# Patient Record
Sex: FEMALE | Race: WHITE | Employment: UNEMPLOYED | URBAN - METROPOLITAN AREA
[De-identification: names, ages, dates, MRNs, and addresses within clinical notes are randomized per-mention and may not be internally consistent; named-entity substitution may affect disease eponyms.]

---

## 2017-11-07 DIAGNOSIS — Z01.419 VISIT FOR GYNECOLOGIC EXAMINATION: ICD-10-CM

## 2017-11-07 DIAGNOSIS — Z30.41 ENCOUNTER FOR BIRTH CONTROL PILLS MAINTENANCE: ICD-10-CM

## 2017-11-07 RX ORDER — NORGESTIMATE AND ETHINYL ESTRADIOL 7DAYSX3 28
1 KIT ORAL DAILY
Qty: 30 TABLET | Refills: 0 | Status: SHIPPED | OUTPATIENT
Start: 2017-11-07 | End: 2017-12-11 | Stop reason: SDUPTHER

## 2017-12-11 DIAGNOSIS — Z01.419 VISIT FOR GYNECOLOGIC EXAMINATION: ICD-10-CM

## 2017-12-11 DIAGNOSIS — Z30.41 ENCOUNTER FOR BIRTH CONTROL PILLS MAINTENANCE: ICD-10-CM

## 2017-12-11 RX ORDER — NORGESTIMATE AND ETHINYL ESTRADIOL 7DAYSX3 28
1 KIT ORAL DAILY
Qty: 30 TABLET | Refills: 11 | Status: SHIPPED | OUTPATIENT
Start: 2017-12-11 | End: 2018-11-21 | Stop reason: SDUPTHER

## 2018-02-06 ENCOUNTER — TELEPHONE (OUTPATIENT)
Dept: FAMILY MEDICINE CLINIC | Age: 53
End: 2018-02-06

## 2018-02-06 DIAGNOSIS — R92.8 ABNORMAL MAMMOGRAM OF LEFT BREAST: Primary | ICD-10-CM

## 2018-02-06 NOTE — TELEPHONE ENCOUNTER
PT CALLING SHE RECEIVED A LETTER IN THE MAIL RE:  HER MAMMOGRAM ---  SHE NEEDS ADDITIONAL IMAGING STUDIES    SHE NEEDS A NEW ORDER - SENT TO  [facility]OM[facility] --  [address]    PHONE:   [phone]  Fax:  [phone]      PT @  [phone] - [address] [place] [address]

## 2018-03-05 ENCOUNTER — TELEPHONE (OUTPATIENT)
Dept: FAMILY MEDICINE CLINIC | Age: 53
End: 2018-03-05

## 2018-03-07 NOTE — TELEPHONE ENCOUNTER
[employer] MESSAGE SENT TO PT, PT DUE FOR DIAGNOSTIC MAMMOGRAM AND BREAST ULTRASOUND.   [facility]

## 2018-11-21 ENCOUNTER — OFFICE VISIT (OUTPATIENT)
Dept: FAMILY MEDICINE CLINIC | Age: 53
End: 2018-11-21

## 2018-11-21 VITALS
HEART RATE: 102 BPM | WEIGHT: 151 LBS | RESPIRATION RATE: 20 BRPM | HEIGHT: 63 IN | BODY MASS INDEX: 26.75 KG/M2 | DIASTOLIC BLOOD PRESSURE: 108 MMHG | SYSTOLIC BLOOD PRESSURE: 210 MMHG

## 2018-11-21 DIAGNOSIS — G89.29 CHRONIC NONINTRACTABLE HEADACHE, UNSPECIFIED HEADACHE TYPE: ICD-10-CM

## 2018-11-21 DIAGNOSIS — Z30.41 ENCOUNTER FOR BIRTH CONTROL PILLS MAINTENANCE: ICD-10-CM

## 2018-11-21 DIAGNOSIS — Z01.419 VISIT FOR GYNECOLOGIC EXAMINATION: ICD-10-CM

## 2018-11-21 DIAGNOSIS — N92.4 EXCESSIVE BLEEDING IN PREMENOPAUSAL PERIOD: ICD-10-CM

## 2018-11-21 DIAGNOSIS — I10 ESSENTIAL HYPERTENSION: Primary | ICD-10-CM

## 2018-11-21 DIAGNOSIS — R51.9 CHRONIC NONINTRACTABLE HEADACHE, UNSPECIFIED HEADACHE TYPE: ICD-10-CM

## 2018-11-21 LAB
A/G RATIO: 1.8 (ref 1.1–2.2)
ALBUMIN SERPL-MCNC: 4.6 G/DL (ref 3.4–5)
ALP BLD-CCNC: 103 U/L (ref 40–129)
ALT SERPL-CCNC: 17 U/L (ref 10–40)
ANION GAP SERPL CALCULATED.3IONS-SCNC: 14 MMOL/L (ref 3–16)
AST SERPL-CCNC: 17 U/L (ref 15–37)
BASOPHILS ABSOLUTE: 0.1 K/UL (ref 0–0.2)
BASOPHILS RELATIVE PERCENT: 0.7 %
BILIRUB SERPL-MCNC: <0.2 MG/DL (ref 0–1)
BUN BLDV-MCNC: 12 MG/DL (ref 7–20)
CALCIUM SERPL-MCNC: 9.7 MG/DL (ref 8.3–10.6)
CHLORIDE BLD-SCNC: 102 MMOL/L (ref 99–110)
CO2: 25 MMOL/L (ref 21–32)
CREAT SERPL-MCNC: 0.7 MG/DL (ref 0.6–1.1)
EOSINOPHILS ABSOLUTE: 0.1 K/UL (ref 0–0.6)
EOSINOPHILS RELATIVE PERCENT: 1.7 %
GFR AFRICAN AMERICAN: >60
GFR NON-AFRICAN AMERICAN: >60
GLOBULIN: 2.6 G/DL
GLUCOSE BLD-MCNC: 105 MG/DL (ref 70–99)
HCT VFR BLD CALC: 38.1 % (ref 36–48)
HEMOGLOBIN: 12.5 G/DL (ref 12–16)
LYMPHOCYTES ABSOLUTE: 2.4 K/UL (ref 1–5.1)
LYMPHOCYTES RELATIVE PERCENT: 28.6 %
MCH RBC QN AUTO: 26.6 PG (ref 26–34)
MCHC RBC AUTO-ENTMCNC: 32.8 G/DL (ref 31–36)
MCV RBC AUTO: 81 FL (ref 80–100)
MONOCYTES ABSOLUTE: 0.6 K/UL (ref 0–1.3)
MONOCYTES RELATIVE PERCENT: 7.4 %
NEUTROPHILS ABSOLUTE: 5.2 K/UL (ref 1.7–7.7)
NEUTROPHILS RELATIVE PERCENT: 61.6 %
PDW BLD-RTO: 14.7 % (ref 12.4–15.4)
PLATELET # BLD: 366 K/UL (ref 135–450)
PMV BLD AUTO: 8.4 FL (ref 5–10.5)
POTASSIUM SERPL-SCNC: 4.3 MMOL/L (ref 3.5–5.1)
RBC # BLD: 4.7 M/UL (ref 4–5.2)
SODIUM BLD-SCNC: 141 MMOL/L (ref 136–145)
TOTAL PROTEIN: 7.2 G/DL (ref 6.4–8.2)
WBC # BLD: 8.4 K/UL (ref 4–11)

## 2018-11-21 PROCEDURE — 36415 COLL VENOUS BLD VENIPUNCTURE: CPT | Performed by: FAMILY MEDICINE

## 2018-11-21 PROCEDURE — 99213 OFFICE O/P EST LOW 20 MIN: CPT | Performed by: FAMILY MEDICINE

## 2018-11-21 RX ORDER — BISOPROLOL FUMARATE AND HYDROCHLOROTHIAZIDE 5; 6.25 MG/1; MG/1
1-2 TABLET ORAL DAILY
Qty: 30 TABLET | Refills: 2 | Status: SHIPPED | OUTPATIENT
Start: 2018-11-21 | End: 2018-11-26 | Stop reason: SDUPTHER

## 2018-11-21 RX ORDER — AMLODIPINE BESYLATE 5 MG/1
5 TABLET ORAL DAILY
Qty: 30 TABLET | Refills: 2 | Status: SHIPPED | OUTPATIENT
Start: 2018-11-21 | End: 2018-12-04 | Stop reason: SDUPTHER

## 2018-11-21 RX ORDER — NORGESTIMATE AND ETHINYL ESTRADIOL 7DAYSX3 28
1 KIT ORAL DAILY
Qty: 30 TABLET | Refills: 5 | Status: SHIPPED | OUTPATIENT
Start: 2018-11-21 | End: 2019-03-12 | Stop reason: SDUPTHER

## 2018-11-21 ASSESSMENT — PATIENT HEALTH QUESTIONNAIRE - PHQ9
1. LITTLE INTEREST OR PLEASURE IN DOING THINGS: 0
SUM OF ALL RESPONSES TO PHQ QUESTIONS 1-9: 0
SUM OF ALL RESPONSES TO PHQ9 QUESTIONS 1 & 2: 0
SUM OF ALL RESPONSES TO PHQ QUESTIONS 1-9: 0
2. FEELING DOWN, DEPRESSED OR HOPELESS: 0

## 2018-11-21 NOTE — PROGRESS NOTES
occipital area   Eyes: Conjunctivae are normal. No scleral icterus. Cardiovascular: Normal rate, regular rhythm and normal heart sounds. No murmur heard. Pulmonary/Chest: Effort normal and breath sounds normal. No respiratory distress. She has no wheezes. She has no rales. Abdominal: Soft. Bowel sounds are normal. She exhibits no distension. There is no tenderness. Musculoskeletal: She exhibits no edema. Neurological: She is alert and oriented to person, place, and time. Skin: Skin is warm and dry. Psychiatric: She has a normal mood and affect. Assessment:       Diagnosis Orders   1. Essential hypertension     2. Visit for gynecologic examination  Norgestim-Eth Estrad Triphasic (TRINESSA, 28,) 0.18/0.215/0.25 MG-35 MCG TABS   3. Encounter for birth control pills maintenance  Norgestim-Eth Estrad Triphasic (TRINESSA, 28,) 0.18/0.215/0.25 MG-35 MCG TABS   4. Chronic nonintractable headache, unspecified headache type     5. Excessive bleeding in premenopausal period             Plan:      CONTINUE BCP RIGHT NOW BUT NEED TO GET BP DOWN  ZIAC 5/6.25 AND NORVASC 5MG DAILY W/ INCREASE TO DOUBLE DOSE OF BOTH AS NEEDED TO GET BP DOWN.   Monitor bp - precautions d/ w pt including low salt/ caffeine moderation/ diet/ exercise w/ low fat / increased fruit / veggies d/w pt  Check labs today  Suspect ha related to bp - cautious use of otc analgesics prn   avoid bladder irritants - push water for oab - if continued sx - consider med  kegel exercises dw/ pt   mmg reviewed  Jacquelyn Lowe MD

## 2018-11-23 ENCOUNTER — PATIENT MESSAGE (OUTPATIENT)
Dept: FAMILY MEDICINE CLINIC | Age: 53
End: 2018-11-23

## 2018-11-26 RX ORDER — BISOPROLOL FUMARATE AND HYDROCHLOROTHIAZIDE 5; 6.25 MG/1; MG/1
1 TABLET ORAL DAILY
Qty: 30 TABLET | Refills: 2 | Status: SHIPPED | OUTPATIENT
Start: 2018-11-26 | End: 2018-12-04 | Stop reason: SDUPTHER

## 2018-11-28 ENCOUNTER — PATIENT MESSAGE (OUTPATIENT)
Dept: FAMILY MEDICINE CLINIC | Age: 53
End: 2018-11-28

## 2018-11-28 NOTE — TELEPHONE ENCOUNTER
From: Wai Dewitt  To: KEYLA Sen CNP  Sent: 11/28/2018 12:58 PM EST  Subject: Prescription Question    SURYA Hubbard, I just went and got my blood pressure checked and it was 148/111. I hope this is better but if I should be doing anything else please let me know. I currently take one each of the medications the  prescribed. Thank you.   ----- Message -----  From: KEYLA Sen CNP  Sent: 11/26/2018 8:50 AM EST  To: Gaston West. Kandu  Subject: RE: Prescription Question  Dr Yarelis Dao is out today. I resent the [address] [place] [address] script with the directions to start with one daily. It looks like he had the directions of 1-2 tabs daily, so that was confusing. It looks like Dr. Yarelis Dao wanted to start with one daily of each of the meds and then increase as needed to get your blood pressures down. It is fine to be taking the amlodipine. That way you can see how you do with that before starting the other. You should be able to fill the other today. Let us know if you have any other troubles. Thanks,  Kelsie Hubbard        ----- Message -----   From: Gaston West. [facility]   Sent: 11/23/2018 11:40 AM EST   To: KEYLA Sen CNP  Subject: Prescription Question    Hello, I went to get these prescriptions but they are waiting on directions from you to fill one of them. I have the amlodipine and started taking it but I don't have the other one yet. Just wanted to know and if I shouldn't take one without the other please let me know.  Thank you

## 2018-12-04 ENCOUNTER — OFFICE VISIT (OUTPATIENT)
Dept: FAMILY MEDICINE CLINIC | Age: 53
End: 2018-12-04
Payer: COMMERCIAL

## 2018-12-04 VITALS
DIASTOLIC BLOOD PRESSURE: 88 MMHG | BODY MASS INDEX: 26.58 KG/M2 | SYSTOLIC BLOOD PRESSURE: 162 MMHG | HEIGHT: 63 IN | WEIGHT: 150 LBS

## 2018-12-04 DIAGNOSIS — I10 ESSENTIAL HYPERTENSION: Primary | ICD-10-CM

## 2018-12-04 DIAGNOSIS — Z12.4 ROUTINE PAPANICOLAOU SMEAR: ICD-10-CM

## 2018-12-04 DIAGNOSIS — D25.9 UTERINE LEIOMYOMA, UNSPECIFIED LOCATION: ICD-10-CM

## 2018-12-04 DIAGNOSIS — N92.0 MENORRHAGIA WITH REGULAR CYCLE: ICD-10-CM

## 2018-12-04 PROCEDURE — 99214 OFFICE O/P EST MOD 30 MIN: CPT | Performed by: FAMILY MEDICINE

## 2018-12-04 RX ORDER — AMLODIPINE BESYLATE 10 MG/1
10 TABLET ORAL DAILY
Qty: 30 TABLET | Refills: 2 | Status: SHIPPED | OUTPATIENT
Start: 2018-12-04 | End: 2019-11-06 | Stop reason: SDUPTHER

## 2018-12-04 RX ORDER — BISOPROLOL FUMARATE AND HYDROCHLOROTHIAZIDE 5; 6.25 MG/1; MG/1
2 TABLET ORAL DAILY
Qty: 60 TABLET | Refills: 2 | Status: SHIPPED | OUTPATIENT
Start: 2018-12-04 | End: 2019-11-06 | Stop reason: SDUPTHER

## 2018-12-04 NOTE — PROGRESS NOTES
Subjective:      Patient ID: Chito Stein is a 48 y.o. female. HPI  No chief complaint on file. BP Readings from Last 3 Encounters:   12/04/18 (!) 190/92   11/21/18 (!) 210/108   12/07/16 124/82     Pulse Readings from Last 3 Encounters:   11/21/18 102   12/07/16 69   01/05/16 70     Wt Readings from Last 3 Encounters:   12/04/18 150 lb (68 kg)   11/21/18 151 lb (68.5 kg)   12/07/16 143 lb 3.2 oz (65 kg)   148/111 on bp check   Today 162/88 today. Mom and mgm both w/ high bp  Doing kegel exercises   No more headaches since last visit  No hx of abnormal pap smear  Had hiv test in past  Same sex partner 32 years  Last pap 2-3 years ago - no abnormal pap in past - no pelvic pain/ dysmenorrhea  Review of Systems    Objective:   Physical Exam   Constitutional: She is oriented to person, place, and time. She appears well-developed and well-nourished. No distress. HENT:   Head: Normocephalic and atraumatic. Mouth/Throat: Oropharynx is clear and moist. No oropharyngeal exudate. Eyes: Conjunctivae are normal. No scleral icterus. Neck: No thyromegaly present. Cardiovascular: Normal rate, regular rhythm, normal heart sounds and intact distal pulses. No murmur heard. Pulmonary/Chest: Effort normal and breath sounds normal. No respiratory distress. She has no wheezes. She has no rales. Breasts symmetric w/o obvious palpable abnormality/ axillary lad   Abdominal: Soft. Bowel sounds are normal. She exhibits no distension. There is no tenderness. Genitourinary:   Genitourinary Comments: Normal genitalia - normal cervix  Large uterus suspected fibroid w/ some prolapse of uterus / cervix    Musculoskeletal: She exhibits no edema. Lymphadenopathy:     She has no cervical adenopathy. Neurological: She is alert and oriented to person, place, and time. Skin: Skin is warm and dry. Psychiatric: She has a normal mood and affect. Assessment:       Diagnosis Orders   1. Essential hypertension     2.

## 2019-11-07 RX ORDER — AMLODIPINE BESYLATE 10 MG/1
10 TABLET ORAL DAILY
Qty: 30 TABLET | Refills: 1 | Status: SHIPPED | OUTPATIENT
Start: 2019-11-07 | End: 2020-07-06

## 2019-11-07 RX ORDER — BISOPROLOL FUMARATE AND HYDROCHLOROTHIAZIDE 5; 6.25 MG/1; MG/1
2 TABLET ORAL DAILY
Qty: 60 TABLET | Refills: 1 | Status: SHIPPED | OUTPATIENT
Start: 2019-11-07 | End: 2020-07-06

## 2020-04-01 RX ORDER — NORGESTIMATE AND ETHINYL ESTRADIOL 7DAYSX3 28
1 KIT ORAL DAILY
Qty: 90 TABLET | Refills: 3 | Status: SHIPPED | OUTPATIENT
Start: 2020-04-01 | End: 2020-11-25 | Stop reason: SDUPTHER

## 2020-07-06 RX ORDER — BISOPROLOL FUMARATE AND HYDROCHLOROTHIAZIDE 5; 6.25 MG/1; MG/1
2 TABLET ORAL DAILY
Qty: 60 TABLET | Refills: 2 | Status: SHIPPED | OUTPATIENT
Start: 2020-07-06 | End: 2020-11-25 | Stop reason: SDUPTHER

## 2020-07-06 RX ORDER — AMLODIPINE BESYLATE 10 MG/1
10 TABLET ORAL DAILY
Qty: 30 TABLET | Refills: 2 | Status: SHIPPED | OUTPATIENT
Start: 2020-07-06 | End: 2020-11-25 | Stop reason: SDUPTHER

## 2020-11-25 ENCOUNTER — VIRTUAL VISIT (OUTPATIENT)
Dept: FAMILY MEDICINE CLINIC | Age: 55
End: 2020-11-25

## 2020-11-25 PROBLEM — I10 ESSENTIAL HYPERTENSION: Status: ACTIVE | Noted: 2020-11-25

## 2020-11-25 PROCEDURE — 99213 OFFICE O/P EST LOW 20 MIN: CPT | Performed by: FAMILY MEDICINE

## 2020-11-25 RX ORDER — AMLODIPINE BESYLATE 10 MG/1
10 TABLET ORAL DAILY
Qty: 30 TABLET | Refills: 5 | Status: SHIPPED | OUTPATIENT
Start: 2020-11-25 | End: 2021-09-23

## 2020-11-25 RX ORDER — BISOPROLOL FUMARATE AND HYDROCHLOROTHIAZIDE 5; 6.25 MG/1; MG/1
2 TABLET ORAL DAILY
Qty: 60 TABLET | Refills: 5 | Status: SHIPPED | OUTPATIENT
Start: 2020-11-25 | End: 2021-09-23

## 2020-11-25 RX ORDER — NORGESTIMATE AND ETHINYL ESTRADIOL 7DAYSX3 28
1 KIT ORAL DAILY
Qty: 90 TABLET | Refills: 1 | Status: SHIPPED | OUTPATIENT
Start: 2020-11-25 | End: 2021-05-20 | Stop reason: SDUPTHER

## 2020-11-25 ASSESSMENT — PATIENT HEALTH QUESTIONNAIRE - PHQ9
1. LITTLE INTEREST OR PLEASURE IN DOING THINGS: 0
SUM OF ALL RESPONSES TO PHQ QUESTIONS 1-9: 0
SUM OF ALL RESPONSES TO PHQ QUESTIONS 1-9: 0
2. FEELING DOWN, DEPRESSED OR HOPELESS: 0
SUM OF ALL RESPONSES TO PHQ9 QUESTIONS 1 & 2: 0
SUM OF ALL RESPONSES TO PHQ QUESTIONS 1-9: 0

## 2020-11-25 NOTE — PROGRESS NOTES
2020    TELEHEALTH EVALUATION -- Audio/Visual (During TELMW-60 public health emergency)    HPI:    Hayden Velázquez (:  1965) has requested an audio/video evaluation for the following concern(s):    Chief Complaint   Patient presents with    Hypertension     HTN ROUTINE FOLLOW UP    Contraception     BCP ROUTINE FOLLOW UP    Other     DEPRESSION SCREENING DUE     BP Readings from Last 3 Encounters:   18 (!) 162/88   18 (!) 210/108   16 124/82     Pulse Readings from Last 3 Encounters:   18 102   16 69   16 70     Wt Readings from Last 3 Encounters:   18 150 lb (68 kg)   18 151 lb (68.5 kg)   16 143 lb 3.2 oz (65 kg)     Staying isolated   Diet/ activity about the same - walks dogs regularly. Sleeping hard to stay asleep - falls asleep okay. Never took remeron before as antidepressant. Checking in - having menopausal symptoms - on bcp - still heavy flow q28 days -some clots - if misses pill, still spots  Heavy days for couple days then light days. Checks bp fairly regularly - taking 1 ziac and 1 amlodipine  bp running 140's/ 78-80. Gets ha in back of head if bp goes high. Quit smoking  No hot sweats/ night flash/ dryness/ mood changes of significance - some cramping during periods. O/w feels pretty good  Review of Systems    Prior to Visit Medications    Medication Sig Taking?  Authorizing Provider   amLODIPine (NORVASC) 10 MG tablet Take 1 tablet by mouth daily  Leona Molina MD   bisoprolol-hydrochlorothiazide [facility]) 5-6.25 MG per tablet TAKE 2 TABLETS BY MOUTH  DAILY  Leona Molina MD   Norgestim-Eth Estrad Triphasic ([address], ,) 0.18/0.215/0.25 MG-35 MCG TABS Take 1 tablet by mouth daily  Leona Molina MD       Social History     Tobacco Use    Smoking status: Former Smoker     Packs/day: 1.00     Years: 30.00     Pack years: 30.00     Types: Cigarettes     Last attempt to quit: 2009     Years since quittin.9    Smokeless tobacco: Never Used   Substance Use Topics    Alcohol use: Yes     Comment: OCCASIONALLY    Drug use: No        PHYSICAL EXAMINATION:  [ INSTRUCTIONS:  \"[x]\" Indicates a positive item  \"[]\" Indicates a negative item  -- DELETE ALL ITEMS NOT EXAMINED]  Vital Signs: (As obtained by patient/caregiver or practitioner observation)    Blood pressure-  Heart rate-    Respiratory rate-    Temperature-  Pulse oximetry-     Constitutional: [x] Appears well-developed and well-nourished [] No apparent distress      [] Abnormal-   Mental status  [x] Alert and awake  [] Oriented to person/place/time []Able to follow commands      Eyes:  EOM    []  Normal  [] Abnormal-  Sclera  []  Normal  [] Abnormal -         Discharge []  None visible  [] Abnormal -    HENT:   [x] Normocephalic, atraumatic. [] Abnormal   [] Mouth/Throat: Mucous membranes are moist.     External Ears [] Normal  [] Abnormal-     Neck: [] No visualized mass     Pulmonary/Chest: [x] Respiratory effort normal.  [] No visualized signs of difficulty breathing or respiratory distress        [] Abnormal-      Musculoskeletal:   [] Normal gait with no signs of ataxia         [] Normal range of motion of neck        [] Abnormal-       Neurological:        [x] No Facial Asymmetry (Cranial nerve 7 motor function) (limited exam to video visit)          [] No gaze palsy        [] Abnormal-         Skin:        [x] No significant exanthematous lesions or discoloration noted on facial skin         [] Abnormal-            Psychiatric:       [x] Normal Affect [] No Hallucinations        [] Abnormal-     Other pertinent observable physical exam findings-     ASSESSMENT/PLAN:  There are no diagnoses linked to this encounter. Diagnosis Orders   1. Essential hypertension     2. Screening for malignant neoplasm of colon  Ambulatory referral to Colonoscopy   3.  Encounter for birth control pills maintenance       bp a little high 140's - increase ziac to 2 tabs/d from 1 tab/d along w/ amlodipine 10mg daily -monitor bp and let me know if problems. Sounds like she is still having regular cycles - cont bcp - low dose 25mcg daily for now -f/u in 6 months w/ basic labs including cmp, lipid,fsh  Pap utd -2 years ago  Will need mmg soon  No longer smoking - congrats  Diet/ activity/ weight gain d/ w pt  Sleep hygiene d/w pt - consider prometrium or sleep aid, though didn't want to take remeron - getting 7 hours hold for now on meds  No follow-ups on file. Daigle Riding is a 50 y.o. female being evaluated by a Virtual Visit (video visit) encounter to address concerns as mentioned above. A caregiver was present when appropriate. Due to this being a TeleHealth encounter (During UJX-58 public health emergency), evaluation of the following organ systems was limited: Vitals/Constitutional/EENT/Resp/CV/GI//MS/Neuro/Skin/Heme-Lymph-Imm. Pursuant to the emergency declaration under the [address] [place] [address], [address] [place] [address] authority and the [employer] and [employer] Act, this Virtual Visit was conducted with patient's (and/or legal guardian's) consent, to reduce the patient's risk of exposure to COVID-19 and provide necessary medical care. The patient (and/or legal guardian) has also been advised to contact this office for worsening conditions or problems, and seek emergency medical treatment and/or call 911 if deemed necessary. Patient identification was verified at the start of the visit: Yes    Total time spent on this encounter: 11-20 minutes    Services were provided through a video synchronous discussion virtually to substitute for in-person clinic visit. Patient and provider were located at their individual homes. --Jorge Lopez MD on 11/25/2020 at 3:58 PM    An electronic signature was used to authenticate this note.

## 2021-05-20 DIAGNOSIS — Z30.41 ENCOUNTER FOR BIRTH CONTROL PILLS MAINTENANCE: ICD-10-CM

## 2021-05-20 RX ORDER — NORGESTIMATE AND ETHINYL ESTRADIOL 7DAYSX3 28
1 KIT ORAL DAILY
Qty: 90 TABLET | Refills: 1 | Status: SHIPPED | OUTPATIENT
Start: 2021-05-20 | End: 2021-11-08 | Stop reason: SDUPTHER

## 2021-06-01 ENCOUNTER — OFFICE VISIT (OUTPATIENT)
Dept: FAMILY MEDICINE CLINIC | Age: 56
End: 2021-06-01

## 2021-06-01 VITALS
BODY MASS INDEX: 29.41 KG/M2 | HEIGHT: 63 IN | RESPIRATION RATE: 18 BRPM | SYSTOLIC BLOOD PRESSURE: 132 MMHG | HEART RATE: 90 BPM | DIASTOLIC BLOOD PRESSURE: 84 MMHG | WEIGHT: 166 LBS

## 2021-06-01 DIAGNOSIS — G47.00 INSOMNIA, UNSPECIFIED TYPE: ICD-10-CM

## 2021-06-01 DIAGNOSIS — I10 ESSENTIAL HYPERTENSION: Primary | ICD-10-CM

## 2021-06-01 DIAGNOSIS — R73.9 HYPERGLYCEMIA: ICD-10-CM

## 2021-06-01 DIAGNOSIS — N63.0 BREAST SWELLING: ICD-10-CM

## 2021-06-01 DIAGNOSIS — R35.0 URINE FREQUENCY: ICD-10-CM

## 2021-06-01 DIAGNOSIS — R63.5 WEIGHT GAIN: ICD-10-CM

## 2021-06-01 DIAGNOSIS — Z12.31 SCREENING MAMMOGRAM, ENCOUNTER FOR: ICD-10-CM

## 2021-06-01 PROCEDURE — 99214 OFFICE O/P EST MOD 30 MIN: CPT | Performed by: FAMILY MEDICINE

## 2021-06-01 SDOH — ECONOMIC STABILITY: TRANSPORTATION INSECURITY
IN THE PAST 12 MONTHS, HAS THE LACK OF TRANSPORTATION KEPT YOU FROM MEDICAL APPOINTMENTS OR FROM GETTING MEDICATIONS?: NO

## 2021-06-01 SDOH — ECONOMIC STABILITY: TRANSPORTATION INSECURITY
IN THE PAST 12 MONTHS, HAS LACK OF TRANSPORTATION KEPT YOU FROM MEETINGS, WORK, OR FROM GETTING THINGS NEEDED FOR DAILY LIVING?: NO

## 2021-06-01 SDOH — ECONOMIC STABILITY: FOOD INSECURITY: WITHIN THE PAST 12 MONTHS, YOU WORRIED THAT YOUR FOOD WOULD RUN OUT BEFORE YOU GOT MONEY TO BUY MORE.: NEVER TRUE

## 2021-06-01 SDOH — ECONOMIC STABILITY: FOOD INSECURITY: WITHIN THE PAST 12 MONTHS, THE FOOD YOU BOUGHT JUST DIDN'T LAST AND YOU DIDN'T HAVE MONEY TO GET MORE.: NEVER TRUE

## 2021-06-01 ASSESSMENT — PATIENT HEALTH QUESTIONNAIRE - PHQ9
2. FEELING DOWN, DEPRESSED OR HOPELESS: 0
SUM OF ALL RESPONSES TO PHQ9 QUESTIONS 1 & 2: 0
SUM OF ALL RESPONSES TO PHQ QUESTIONS 1-9: 0
1. LITTLE INTEREST OR PLEASURE IN DOING THINGS: 0
SUM OF ALL RESPONSES TO PHQ QUESTIONS 1-9: 0
SUM OF ALL RESPONSES TO PHQ QUESTIONS 1-9: 0

## 2021-06-01 ASSESSMENT — SOCIAL DETERMINANTS OF HEALTH (SDOH): HOW HARD IS IT FOR YOU TO PAY FOR THE VERY BASICS LIKE FOOD, HOUSING, MEDICAL CARE, AND HEATING?: NOT HARD AT ALL

## 2021-06-01 NOTE — PROGRESS NOTES
[facility] Medicine  Clinic Note    Date: 6/1/2021                                               Subjective:     Chief Complaint   Patient presents with    Hypertension     HTN ROUTINE FOLLOW UP     Edema     SWELLING UNDER LEFT ARM HAS GONE DOWN      HPI  Took bp med on way over here  Some urgency -lasts for an hour or so after water pill. Swelling under left arm - ? Linked to bump on arm  Feels something into breast - dragging left foot - seems to effect whole left side  Swelling started after car wash  Activity tends to flare symptoms in arm- swelling -no pain - now going down. No breast nodules - hx of cysts - bra may aggravate  Needs refill on bcp - still having fairly regular periods  No menopausal symptoms  Pain left arch/ heel area medially  - ? Shoewear.   Trouble sleeping  occ gerd  Bothered by weight gain       Patient Active Problem List    Diagnosis Date Noted    Essential hypertension 11/25/2020     Past Medical History:   Diagnosis Date    Elevated blood pressure      Past Surgical History:   Procedure Laterality Date    TONSILLECTOMY      AS CHILD     Office Visit on 11/21/2018   Component Date Value Ref Range Status    WBC 11/21/2018 8.4  4.0 - 11.0 K/uL Final    RBC 11/21/2018 4.70  4.00 - 5.20 M/uL Final    Hemoglobin 11/21/2018 12.5  12.0 - 16.0 g/dL Final    Hematocrit 11/21/2018 38.1  36.0 - 48.0 % Final    MCV 11/21/2018 81.0  80.0 - 100.0 fL Final    MCH 11/21/2018 26.6  26.0 - 34.0 pg Final    MCHC 11/21/2018 32.8  31.0 - 36.0 g/dL Final    RDW 11/21/2018 14.7  12.4 - 15.4 % Final    Platelets 31/39/8304 366  135 - 450 K/uL Final    MPV 11/21/2018 8.4  5.0 - 10.5 fL Final    Neutrophils % 11/21/2018 61.6  % Final    Lymphocytes % 11/21/2018 28.6  % Final    Monocytes % 11/21/2018 7.4  % Final    Eosinophils % 11/21/2018 1.7  % Final    Basophils % 11/21/2018 0.7  % Final    Neutrophils Absolute 11/21/2018 5.2  1.7 - 7.7 K/uL Final    Lymphocytes Absolute 11/21/2018 2.4  1.0 - 5.1 K/uL Final    Monocytes Absolute 11/21/2018 0.6  0.0 - 1.3 K/uL Final    Eosinophils Absolute 11/21/2018 0.1  0.0 - 0.6 K/uL Final    Basophils Absolute 11/21/2018 0.1  0.0 - 0.2 K/uL Final    Sodium 11/21/2018 141  136 - 145 mmol/L Final    Potassium 11/21/2018 4.3  3.5 - 5.1 mmol/L Final    Chloride 11/21/2018 102  99 - 110 mmol/L Final    CO2 11/21/2018 25  21 - 32 mmol/L Final    Anion Gap 11/21/2018 14  3 - 16 Final    Glucose 11/21/2018 105* 70 - 99 mg/dL Final    BUN 11/21/2018 12  7 - 20 mg/dL Final    CREATININE 11/21/2018 0.7  0.6 - 1.1 mg/dL Final    GFR Non- 11/21/2018 >60  >60 Final    Comment: >60 mL/min/1.73m2 EGFR, calc. for ages 25 and older using the  MDRD formula (not corrected for weight), is valid for stable  renal function.  GFR  11/21/2018 >60  >60 Final    Comment: Chronic Kidney Disease: less than 60 ml/min/1.73 sq.m. Kidney Failure: less than 15 ml/min/1.73 sq.m. Results valid for patients 18 years and older.       Calcium 11/21/2018 9.7  8.3 - 10.6 mg/dL Final    Total Protein 11/21/2018 7.2  6.4 - 8.2 g/dL Final    Albumin 11/21/2018 4.6  3.4 - 5.0 g/dL Final    Albumin/Globulin Ratio 11/21/2018 1.8  1.1 - 2.2 Final    Total Bilirubin 11/21/2018 <0.2  0.0 - 1.0 mg/dL Final    Alkaline Phosphatase 11/21/2018 103  40 - 129 U/L Final    ALT 11/21/2018 17  10 - 40 U/L Final    AST 11/21/2018 17  15 - 37 U/L Final    Globulin 11/21/2018 2.6  g/dL Final     Family History   Problem Relation Age of Onset    Other Father         DIVERTICULITIS     Current Outpatient Medications   Medication Sig Dispense Refill    Norgestim-Eth Estrad Triphasic (TRINESSA, 28,) 0.18/0.215/0.25 MG-35 MCG TABS Take 1 tablet by mouth daily 90 tablet 1    amLODIPine (NORVASC) 10 MG tablet Take 1 tablet by mouth daily 30 tablet 5    bisoprolol-hydroCHLOROthiazide (ZIAC) 5-6.25 MG per tablet Take 2 tablets by mouth daily 60 tablet 5     No current facility-administered medications for this visit. Allergies   Allergen Reactions    Augmentin [Amoxicillin-Pot Clavulanate]      Tingling, lip swelling,burning sensation - w/ amoxil also       Review of Systems    Objective:  /84 (Site: Left Upper Arm, Position: Sitting, Cuff Size: Medium Adult)   Pulse 90   Resp 18   Ht 5' 3\" (1.6 m)   Wt 166 lb (75.3 kg)   LMP 06/01/2021 (Exact Date)   Breastfeeding No   BMI 29.41 kg/m²     BP Readings from Last 3 Encounters:   06/01/21 132/84   12/04/18 (!) 162/88   11/21/18 (!) 210/108       Pulse Readings from Last 3 Encounters:   06/01/21 90   11/21/18 102   12/07/16 69       Wt Readings from Last 3 Encounters:   06/01/21 166 lb (75.3 kg)   12/04/18 150 lb (68 kg)   11/21/18 151 lb (68.5 kg)       Physical Exam  Constitutional:       General: She is not in acute distress. Appearance: She is well-developed. HENT:      Head: Normocephalic and atraumatic. Mouth/Throat:      Pharynx: No oropharyngeal exudate. Eyes:      General: No scleral icterus. Conjunctiva/sclera: Conjunctivae normal.   Neck:      Thyroid: No thyromegaly. Cardiovascular:      Rate and Rhythm: Normal rate and regular rhythm. Heart sounds: Normal heart sounds. No murmur heard. Pulmonary:      Effort: Pulmonary effort is normal. No respiratory distress. Breath sounds: Normal breath sounds. No wheezing or rales. Abdominal:      General: Bowel sounds are normal. There is no distension. Palpations: Abdomen is soft. Tenderness: There is no abdominal tenderness. Genitourinary:     Comments: No axillary lad  Musculoskeletal:         General: No swelling. Comments: Tender medial heel near arch on left w/o inflammation  Sub cutaneous nodule left lateral arm -no inflammation. Lymphadenopathy:      Cervical: No cervical adenopathy. Skin:     General: Skin is warm and dry.    Neurological:      Mental Status: She is alert and oriented to person, place, and time. Assessment/Plan:      Diagnosis Orders   1. Essential hypertension     2. Urine frequency     3. Breast swelling  MELANY DIGITAL DIAGNOSTIC W OR WO CAD BILATERAL    US BREAST COMPLETE LEFT   4. Hyperglycemia     5. Weight gain     6. Insomnia, unspecified type     swelling left axilla - improved - ? musculoskeletal  Mmg soon  Diet/ activity dw/ pt - more walking in light of weight gain  Pap soon/ gyn exam  Plantar fasciitis left heel - stretch/ check foot wear  Consider melatonin/ otc options prn sleep. bp stable - consider change hctz to acei/arb as bp presently controlled = on ziac / hctz and amlodipine  Sleep hygiene d/ w pt  utd on covid vaccine. Needs lipid,tsh w/ reflex, cmp, fsh, hep c, ?hiv in near future  Colonoscopy encouraged  bp stable presently - concerns w/ urinary se of hctz - frequency/ urgency.   Cut back on caffeine - timing of medicine dw pt -   Jorge L Buck MD, MD  6/1/2021  10:14 AM

## 2021-07-16 ENCOUNTER — PATIENT MESSAGE (OUTPATIENT)
Dept: FAMILY MEDICINE CLINIC | Age: 56
End: 2021-07-16

## 2021-07-20 NOTE — TELEPHONE ENCOUNTER
From: MARIANNE TSE  To: Agueda Argueta  Sent: 7/16/2021 9:10 AM EDT  Subject: MAMMOGRAM ORDER    On 6/1/21, you were given an order to schedule a screening mammogram. As of today, we have not received any test results. Please call and schedule an appointment at [address] [place] [address] within the next 30 days. If you have already had this test performed or if you are no longer interested in having the test, please call our office staff.     SAAD Llanes

## 2021-08-05 ENCOUNTER — TELEPHONE (OUTPATIENT)
Dept: FAMILY MEDICINE CLINIC | Age: 56
End: 2021-08-05

## 2021-08-05 DIAGNOSIS — R92.8 ABNORMAL MAMMOGRAM OF LEFT BREAST: Primary | ICD-10-CM

## 2021-08-05 NOTE — TELEPHONE ENCOUNTER
PT IS CALLING FOR MAMOGRAM RESULTS. SHE HAD THIS DONE AT [facility] AND GOT A LETTER IN THE MAIL STATING THAT SHE NEEDS ADDITIONAL TESTING. THIS IS IN THE IMAGING TAB AND IS SCANNED INTO PT CHART.     PT DOES NOT KNOW WHAT THE IMAGING STATED JUST THAT SHE NEEDS MORE TESTING

## 2021-08-13 ENCOUNTER — TELEPHONE (OUTPATIENT)
Dept: FAMILY MEDICINE CLINIC | Age: 56
End: 2021-08-13

## 2021-08-13 NOTE — TELEPHONE ENCOUNTER
With review of her 2021 and 2018 mammogram results, it appears as though in the left breast there is a 3cm area (larger than in the past) that was seen in 2018 mammogram that likely represents a cyst, or a calcium deposit as she mentioned. However, there are additional areas in the left breast that are smaller than 1 cm that were not seen in 2018 mammogram that are also in left breast and additional imaging continues to be the recommendation.

## 2021-08-13 NOTE — TELEPHONE ENCOUNTER
Pt had a mammogram done and was told she needed to have additional testing done. She wants someone to call her back to see if it is the same result as the last two results. She had additional testing last year also and it is expensive. Is it the same thing?    please call

## 2021-08-13 NOTE — TELEPHONE ENCOUNTER
CALLED AND SPOKE TO PATIENT. SHE STATES SHE HAD MAMMOGRAM DONE ON 7/28/21. SHE WAS NOTIFIED BY OUR OFFICE TO HAVE ADDITIONAL TESTING DONE. SHE STATES SHE HAS HAD THIS IN THE PAST AND IT ENDS UP BEING CALCIUM DEPOSITS. SHE WANTS TO KNOW IF SHOE GOES THROUGH ALL THIS AGAIN , ARE THEY GOING TO BE LOOKING AT THE SAME THING? SHE DOES NOT WANT TO GO THROUGH ALL THIS IF IT REALLY IS NOT NECESSARY.  SC

## 2021-08-13 NOTE — TELEPHONE ENCOUNTER
Called and left message for patient.  Also sent information to patient via [employer] for her as well. sc

## 2021-09-23 RX ORDER — AMLODIPINE BESYLATE 10 MG/1
10 TABLET ORAL DAILY
Qty: 30 TABLET | Refills: 5 | Status: SHIPPED | OUTPATIENT
Start: 2021-09-23 | End: 2022-09-02 | Stop reason: SDUPTHER

## 2021-09-23 RX ORDER — BISOPROLOL FUMARATE AND HYDROCHLOROTHIAZIDE 5; 6.25 MG/1; MG/1
TABLET ORAL
Qty: 60 TABLET | Refills: 5 | Status: SHIPPED | OUTPATIENT
Start: 2021-09-23 | End: 2022-06-02

## 2021-11-19 ENCOUNTER — TELEPHONE (OUTPATIENT)
Dept: FAMILY MEDICINE CLINIC | Age: 56
End: 2021-11-19

## 2021-11-19 NOTE — TELEPHONE ENCOUNTER
[facility] FOR PT CB, SHE HAS AN OUTSTANDING BREAST ULTRASOUND ORDER THAT WE NEED TO MAKE SURE SHE IS GETTING DONE SINCE THERE WAS NEW SPOT ON THE CURRENT MAMMOGRAM.  I WILL AWAIT A CB FROM THE PT.  [facility]

## 2021-11-22 ENCOUNTER — TELEPHONE (OUTPATIENT)
Dept: FAMILY MEDICINE CLINIC | Age: 56
End: 2021-11-22

## 2021-11-22 NOTE — TELEPHONE ENCOUNTER
----- Message from Sunny Reed sent at 11/22/2021  9:17 AM EST -----  Subject: Referral Request    QUESTIONS   Reason for referral request? Patient needs order sent in for an ultrasound   on her left breast. [facility]   Has the physician seen you for this condition before? No   Preferred Specialist (if applicable)? Do you already have an appointment scheduled? Additional Information for Provider? would like a call when its sent.  ---------------------------------------------------------------------------  --------------  [address]  What is the best way for the office to contact you? OK to leave message on   voicemail  Preferred Call Back Phone Number?  [phone]

## 2021-12-06 ENCOUNTER — TELEPHONE (OUTPATIENT)
Dept: FAMILY MEDICINE CLINIC | Age: 56
End: 2021-12-06

## 2021-12-06 DIAGNOSIS — R92.8 ABNORMAL MAMMOGRAM OF LEFT BREAST: Primary | ICD-10-CM

## 2021-12-06 NOTE — TELEPHONE ENCOUNTER
Patient needs an order faxed for a diagnostic mammogram of the left breast faxed to them for abnormal imagine       Fax # [phone]

## 2022-02-07 DIAGNOSIS — Z30.41 ENCOUNTER FOR BIRTH CONTROL PILLS MAINTENANCE: ICD-10-CM

## 2022-02-08 RX ORDER — NORGESTIMATE AND ETHINYL ESTRADIOL 7DAYSX3 28
1 KIT ORAL DAILY
Qty: 90 TABLET | Refills: 0 | Status: SHIPPED | OUTPATIENT
Start: 2022-02-08 | End: 2022-05-06 | Stop reason: SDUPTHER

## 2022-05-06 DIAGNOSIS — Z30.41 ENCOUNTER FOR BIRTH CONTROL PILLS MAINTENANCE: ICD-10-CM

## 2022-05-06 NOTE — TELEPHONE ENCOUNTER
LV 6/1/21 WITH DR GALLAGHER NV NONE  MY CHART MESSAGE SENT FOR PT TO SCHEDULE PAP FOR BIRTH CONTROL PILL MAINTENANCE    SPOKE WITH PT. SHE IS CURRENTLY OUT OF TOWN AND CAN SCHEDULE AN APPT WHEN SHE GETS BACK. SHE WOULD LIKE TO KNOW IF YOU WILL REFILL MED.  SHE HAS FINISHED LAST PACK

## 2022-05-08 DIAGNOSIS — Z30.41 ENCOUNTER FOR BIRTH CONTROL PILLS MAINTENANCE: ICD-10-CM

## 2022-05-08 RX ORDER — NORGESTIMATE AND ETHINYL ESTRADIOL 7DAYSX3 28
1 KIT ORAL DAILY
Qty: 90 TABLET | Refills: 0 | Status: SHIPPED | OUTPATIENT
Start: 2022-05-08 | End: 2022-07-22 | Stop reason: SDUPTHER

## 2022-05-09 RX ORDER — NORGESTIMATE AND ETHINYL ESTRADIOL 7DAYSX3 28
KIT ORAL
Qty: 84 TABLET | Refills: 0 | OUTPATIENT
Start: 2022-05-09

## 2022-06-02 ENCOUNTER — OFFICE VISIT (OUTPATIENT)
Dept: FAMILY MEDICINE CLINIC | Age: 57
End: 2022-06-02

## 2022-06-02 VITALS
BODY MASS INDEX: 28.17 KG/M2 | WEIGHT: 159 LBS | DIASTOLIC BLOOD PRESSURE: 80 MMHG | HEIGHT: 63 IN | SYSTOLIC BLOOD PRESSURE: 136 MMHG | OXYGEN SATURATION: 99 % | HEART RATE: 80 BPM

## 2022-06-02 DIAGNOSIS — N95.1 PERIMENOPAUSAL: ICD-10-CM

## 2022-06-02 DIAGNOSIS — R35.0 URINARY FREQUENCY: ICD-10-CM

## 2022-06-02 DIAGNOSIS — D25.9 UTERINE LEIOMYOMA, UNSPECIFIED LOCATION: ICD-10-CM

## 2022-06-02 DIAGNOSIS — N60.02 CYST OF LEFT BREAST: ICD-10-CM

## 2022-06-02 DIAGNOSIS — I10 ESSENTIAL HYPERTENSION: Primary | ICD-10-CM

## 2022-06-02 DIAGNOSIS — E78.00 HYPERCHOLESTEREMIA: ICD-10-CM

## 2022-06-02 DIAGNOSIS — Z12.4 CERVICAL CANCER SCREENING: ICD-10-CM

## 2022-06-02 LAB
A/G RATIO: 2.2 (ref 1.1–2.2)
ALBUMIN SERPL-MCNC: 4.8 G/DL (ref 3.4–5)
ALP BLD-CCNC: 93 U/L (ref 40–129)
ALT SERPL-CCNC: 11 U/L (ref 10–40)
ANION GAP SERPL CALCULATED.3IONS-SCNC: 17 MMOL/L (ref 3–16)
AST SERPL-CCNC: 12 U/L (ref 15–37)
BILIRUB SERPL-MCNC: <0.2 MG/DL (ref 0–1)
BILIRUBIN, POC: NORMAL
BLOOD URINE, POC: NORMAL
BUN BLDV-MCNC: 12 MG/DL (ref 7–20)
CALCIUM SERPL-MCNC: 9.6 MG/DL (ref 8.3–10.6)
CHLORIDE BLD-SCNC: 105 MMOL/L (ref 99–110)
CHOLESTEROL, TOTAL: 276 MG/DL (ref 0–199)
CLARITY, POC: NORMAL
CO2: 19 MMOL/L (ref 21–32)
COLOR, POC: NORMAL
CREAT SERPL-MCNC: 0.6 MG/DL (ref 0.6–1.1)
FOLLICLE STIMULATING HORMONE: 25.3 MIU/ML
GFR AFRICAN AMERICAN: >60
GFR NON-AFRICAN AMERICAN: >60
GLUCOSE BLD-MCNC: 113 MG/DL (ref 70–99)
GLUCOSE URINE, POC: NORMAL
HDLC SERPL-MCNC: 63 MG/DL (ref 40–60)
KETONES, POC: NORMAL
LDL CHOLESTEROL CALCULATED: 189 MG/DL
LEUKOCYTE EST, POC: NORMAL
NITRITE, POC: NORMAL
PH, POC: 6.5
POTASSIUM SERPL-SCNC: 4.5 MMOL/L (ref 3.5–5.1)
PROTEIN, POC: NORMAL
SODIUM BLD-SCNC: 141 MMOL/L (ref 136–145)
SPECIFIC GRAVITY, POC: 1.01
TOTAL PROTEIN: 7 G/DL (ref 6.4–8.2)
TRIGL SERPL-MCNC: 118 MG/DL (ref 0–150)
UROBILINOGEN, POC: 0.2
VLDLC SERPL CALC-MCNC: 24 MG/DL

## 2022-06-02 PROCEDURE — 81002 URINALYSIS NONAUTO W/O SCOPE: CPT | Performed by: FAMILY MEDICINE

## 2022-06-02 PROCEDURE — 99213 OFFICE O/P EST LOW 20 MIN: CPT | Performed by: FAMILY MEDICINE

## 2022-06-02 PROCEDURE — 36415 COLL VENOUS BLD VENIPUNCTURE: CPT | Performed by: FAMILY MEDICINE

## 2022-06-02 SDOH — ECONOMIC STABILITY: FOOD INSECURITY: WITHIN THE PAST 12 MONTHS, THE FOOD YOU BOUGHT JUST DIDN'T LAST AND YOU DIDN'T HAVE MONEY TO GET MORE.: NEVER TRUE

## 2022-06-02 SDOH — ECONOMIC STABILITY: FOOD INSECURITY: WITHIN THE PAST 12 MONTHS, YOU WORRIED THAT YOUR FOOD WOULD RUN OUT BEFORE YOU GOT MONEY TO BUY MORE.: NEVER TRUE

## 2022-06-02 ASSESSMENT — PATIENT HEALTH QUESTIONNAIRE - PHQ9
SUM OF ALL RESPONSES TO PHQ9 QUESTIONS 1 & 2: 0
2. FEELING DOWN, DEPRESSED OR HOPELESS: 0
SUM OF ALL RESPONSES TO PHQ QUESTIONS 1-9: 0
1. LITTLE INTEREST OR PLEASURE IN DOING THINGS: 0
SUM OF ALL RESPONSES TO PHQ QUESTIONS 1-9: 0

## 2022-06-02 ASSESSMENT — SOCIAL DETERMINANTS OF HEALTH (SDOH): HOW HARD IS IT FOR YOU TO PAY FOR THE VERY BASICS LIKE FOOD, HOUSING, MEDICAL CARE, AND HEATING?: NOT HARD AT ALL

## 2022-06-02 NOTE — PROGRESS NOTES
2022    Nara Ashton (:  1965) is a 49 y.o. female, here for a preventive medicine evaluation. Chief Complaint   Patient presents with    Annual Exam     ANNUAL ROUTINE PHYSICAL WITH PAP    last pap 5 years ago  Stopped ziac as some urinary hesitancy despite urge  Taking norvasc 10mg daily  Drinking cranberry juice and using old amoxil   Periods seem to be lighter generally -still some clotting but not as much as usually gets. No hot flash/ night sweats - a little cramping which is new for her on left side - dx w/ fibroid in past.  No hx of abnormal pap  Went to work - got insurance - hurt back and stopped job but not working again yet though back doing better now. Still urinating a lot - ? uti  No swelling  Tried melatonin but trouble staying asleep -not helping a lot  Wakes to urinate. Cutting out fluids at night - drinking water/ cranberry juice and coffee in am- limited soda -1 every few days. Left breast cyst on mmg 1 year ago - never got u/s done  No obvious pain/ lump other than fullness -more in muscle on left side  BP Readings from Last 3 Encounters:   22 136/80   21 132/84   18 (!) 162/88     Pulse Readings from Last 3 Encounters:   22 80   21 90   18 102     Wt Readings from Last 3 Encounters:   22 159 lb (72.1 kg)   21 166 lb (75.3 kg)   18 150 lb (68 kg)       Patient Active Problem List   Diagnosis    Essential hypertension       Review of Systems    Prior to Visit Medications    Medication Sig Taking?  Authorizing Provider   Norgestim-Eth Estrad Triphasic (TRINESSA, 28,) 0.18/0.215/0.25 MG-35 MCG TABS Take 1 tablet by mouth daily Yes Lyudmila Barry MD   amLODIPine (NORVASC) 10 MG tablet Take 1 tablet by mouth daily Yes Lyudmila Barry MD   bisoprolol-hydroCHLOROthiazide [facility]) 5-6.25 MG per tablet Take 2 tablets by mouth once daily  Patient not taking: Reported on 2022  Lyudmila Barry MD        Allergies   Allergen Reactions    Augmentin [Amoxicillin-Pot Clavulanate]      Tingling, lip swelling,burning sensation - w/ amoxil also       Past Medical History:   Diagnosis Date    Elevated blood pressure        Past Surgical History:   Procedure Laterality Date    TONSILLECTOMY      AS CHILD       Social History     Socioeconomic History    Marital status:      Spouse name: Not on file    Number of children: Not on file    Years of education: Not on file    Highest education level: Not on file   Occupational History    Not on file   Tobacco Use    Smoking status: Former Smoker     Packs/day: 1.00     Years: 30.00     Pack years: 30.00     Types: Cigarettes     Quit date: 2009     Years since quittin.4    Smokeless tobacco: Never Used   Substance and Sexual Activity    Alcohol use: Yes     Comment: OCCASIONALLY    Drug use: No    Sexual activity: Not on file   Other Topics Concern    Not on file   Social History Narrative    Not on file     Social Determinants of Health     Financial Resource Strain: Low Risk     Difficulty of Paying Living Expenses: Not hard at all   Food Insecurity: No Food Insecurity    Worried About [address] in the Last Year: Never true    [address] in the Last Year: Never true   Transportation Needs: No Transportation Needs    Lack of Transportation (Medical): No    Lack of Transportation (Non-Medical):  No   Physical Activity:     Days of Exercise per Week: Not on file    Minutes of Exercise per Session: Not on file   Stress:     Feeling of Stress : Not on file   Social Connections:     Frequency of Communication with Friends and Family: Not on file    Frequency of Social Gatherings with Friends and Family: Not on file    Attends Anabaptist Services: Not on file    Active Member of Clubs or Organizations: Not on file    Attends Club or Organization Meetings: Not on file    Marital Status: Not on file   Intimate Partner Violence:     Fear of Current or Ex-Partner: Not on file    Emotionally Abused: Not on file    Physically Abused: Not on file    Sexually Abused: Not on file   Housing Stability:     Unable to Pay for Housing in the Last Year: Not on file    Number of Places Lived in the Last Year: Not on file    Unstable Housing in the Last Year: Not on file        Family History   Problem Relation Age of Onset    Other Father         DIVERTICULITIS       ADVANCE DIRECTIVE: N, <no information>    Vitals:    06/02/22 0828   BP: 136/80   Site: Left Upper Arm   Position: Sitting   Cuff Size: Medium Adult   Pulse: 80   SpO2: 99%   Weight: 159 lb (72.1 kg)   Height: 5' 3\" (1.6 m)     Estimated body mass index is 28.17 kg/m² as calculated from the following:    Height as of this encounter: 5' 3\" (1.6 m). Weight as of this encounter: 159 lb (72.1 kg). Physical Exam  Constitutional:       General: She is not in acute distress. Appearance: She is well-developed. HENT:      Head: Normocephalic and atraumatic. Mouth/Throat:      Pharynx: No oropharyngeal exudate. Eyes:      General: No scleral icterus. Conjunctiva/sclera: Conjunctivae normal.   Neck:      Thyroid: No thyromegaly. Cardiovascular:      Rate and Rhythm: Normal rate and regular rhythm. Heart sounds: Normal heart sounds. No murmur heard. Pulmonary:      Effort: Pulmonary effort is normal. No respiratory distress. Breath sounds: Normal breath sounds. No wheezing or rales. Abdominal:      General: Bowel sounds are normal. There is no distension. Palpations: Abdomen is soft. Tenderness: There is no abdominal tenderness. Genitourinary:     Comments: Normal ext genitalia  Large fibroid uterus - no pain/ adnexal mass/   Musculoskeletal:         General: No swelling. Lymphadenopathy:      Cervical: No cervical adenopathy. Skin:     General: Skin is warm and dry. Neurological:      Mental Status: She is alert and oriented to person, place, and time. No flowsheet data found. Lab Results   Component Value Date    CHOL 243 01/05/2016    TRIG 78 01/05/2016    HDL 51 01/05/2016    LDLCALC 176 01/05/2016    GLUCOSE 105 11/21/2018       The ASCVD Risk score (Clifford Weber, et al., 2013) failed to calculate for the following reasons:    Cannot find a previous HDL lab    Cannot find a previous total cholesterol lab    Immunization History   Administered Date(s) Administered    Influenza Virus Vaccine 10/20/2012    Influenza, Intradermal, Preservative free 01/05/2016       Health Maintenance   Topic Date Due    COVID-19 Vaccine (1) Never done    HIV screen  Never done    Hepatitis C screen  Never done    DTaP/Tdap/Td vaccine (1 - Tdap) Never done    Diabetes screen  Never done    Colorectal Cancer Screen  Never done    Shingles vaccine (1 of 2) Never done    Low dose CT lung screening  Never done    Lipids  01/05/2021    Cervical cancer screen  12/04/2021    Depression Screen  06/01/2022    Flu vaccine (Season Ended) 09/01/2022    Breast cancer screen  07/27/2023    Hepatitis A vaccine  Aged Out    Hepatitis B vaccine  Aged Out    Hib vaccine  Aged Out    Meningococcal (ACWY) vaccine  Aged Out    Pneumococcal 0-64 years Vaccine  Aged Out       Assessment & Plan     Diagnosis Orders   1. Essential hypertension     2. Cervical cancer screening     3. Urinary frequency     4. Hypercholesteremia     5.  Cyst of left breast     check ua - normal - suspect oab - dw/ pt - avoid bladder irritants  D/w pt u/s breast but since exam normal - no sxs - pt wants to hold unless something changes  Fibroids d/w pt -   d/w pt labs  Vaccine options d/w pt but limited as self pay  Diet/ activity dw/ pt  bp well controlled presently - on 10mg norvasc  D/w pt need for bcp  mmg okay one year ago  Check cmp, lipid, possible fsh w/ consideration of stopping bcp if periods continue to slow down  --Cuca Winter MD

## 2022-07-21 ENCOUNTER — HOSPITAL ENCOUNTER (OUTPATIENT)
Dept: WOMENS IMAGING | Age: 57
Discharge: HOME | End: 2022-07-21

## 2022-07-21 ENCOUNTER — HOSPITAL ENCOUNTER (OUTPATIENT)
Dept: ULTRASOUND IMAGING | Age: 57
Discharge: HOME | End: 2022-07-21

## 2022-07-21 VITALS — BODY MASS INDEX: 27.82 KG/M2 | WEIGHT: 157 LBS | HEIGHT: 63 IN

## 2022-07-21 DIAGNOSIS — N60.02 CYST OF LEFT BREAST: ICD-10-CM

## 2022-07-21 DIAGNOSIS — R92.8 ABNORMAL MAMMOGRAM: ICD-10-CM

## 2022-07-21 PROCEDURE — 76641 ULTRASOUND BREAST COMPLETE: CPT

## 2022-07-21 PROCEDURE — 77066 DX MAMMO INCL CAD BI: CPT

## 2022-07-22 DIAGNOSIS — Z30.41 ENCOUNTER FOR BIRTH CONTROL PILLS MAINTENANCE: ICD-10-CM

## 2022-07-22 RX ORDER — NORGESTIMATE AND ETHINYL ESTRADIOL 7DAYSX3 28
1 KIT ORAL DAILY
Qty: 90 TABLET | Refills: 0 | Status: SHIPPED | OUTPATIENT
Start: 2022-07-22 | End: 2022-10-18 | Stop reason: SDUPTHER

## 2022-08-16 ENCOUNTER — OFFICE VISIT (OUTPATIENT)
Dept: FAMILY MEDICINE CLINIC | Age: 57
End: 2022-08-16
Payer: COMMERCIAL

## 2022-08-16 VITALS
WEIGHT: 161 LBS | DIASTOLIC BLOOD PRESSURE: 74 MMHG | HEART RATE: 116 BPM | OXYGEN SATURATION: 97 % | BODY MASS INDEX: 28.53 KG/M2 | HEIGHT: 63 IN | SYSTOLIC BLOOD PRESSURE: 130 MMHG

## 2022-08-16 DIAGNOSIS — M72.2 PLANTAR FASCIITIS: ICD-10-CM

## 2022-08-16 DIAGNOSIS — Z12.4 CERVICAL CANCER SCREENING: Primary | ICD-10-CM
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
## 2022-08-16 DIAGNOSIS — D25.9 UTERINE LEIOMYOMA, UNSPECIFIED LOCATION: ICD-10-CM

## 2022-08-16 PROCEDURE — 99213 OFFICE O/P EST LOW 20 MIN: CPT | Performed by: FAMILY MEDICINE

## 2022-08-16 NOTE — PROGRESS NOTES
[facility] Medicine  Clinic Note    Date: 8/16/2022                                               Subjective:     Chief Complaint   Patient presents with    Gynecologic Exam     REPEAT PAP    Pain     PAIN IN LEFT HEAL AND BUG BITE ON RIGHT      HPI  PAIN IN LEFT LOW QUAD ABDOMEN  SOME TROUBLE URINATING AT NIGHT ONLY  HAS TO PUSH ON ABDOMEN TO GET URINE. HAS LARGE FIBROID - WANTS TO AVOID CALEB  NOT THROUGH MENOPAUSE YET UNFORTUNTATELY  Has bite? On right low leg - still there for 1-2 months  Pain in left heel w/ standing after sitting/ lying down  Loosens up lately.   Recent abnormal mammogram         Patient Active Problem List    Diagnosis Date Noted    Essential hypertension 11/25/2020     Past Medical History:   Diagnosis Date    Elevated blood pressure      Past Surgical History:   Procedure Laterality Date    TONSILLECTOMY      AS CHILD     Office Visit on 06/02/2022   Component Date Value Ref Range Status    Color, UA 06/02/2022 RED   Final    Clarity, UA 06/02/2022 CLOUDY   Final    Glucose, UA POC 06/02/2022 NEG   Final    Bilirubin, UA 06/02/2022 NEG   Final    Ketones, UA 06/02/2022 NEG   Final    Spec Grav, UA 06/02/2022 1.010   Final    Blood, UA POC 06/02/2022 LARGE   Final    ON PERIOD    pH, UA 06/02/2022 6.5   Final    Protein, UA POC 06/02/2022 NEG   Final    Urobilinogen, UA 06/02/2022 0.2   Final    Leukocytes, UA 06/02/2022 NEG   Final    Nitrite, UA 06/02/2022 NEG   Final    FSH 06/02/2022 25.3  mIU/mL Final    Comment: Default Normal Ranges    Female                        Male  Follicular:  3.2-96.4      1.4-18.1  Midcycle:    3.4-33.4  Luteal:      1.5-9.1  Pregnant:    <0.3  Postmeno:    23.0-116.3      Sodium 06/02/2022 141  136 - 145 mmol/L Final    Potassium 06/02/2022 4.5  3.5 - 5.1 mmol/L Final    Chloride 06/02/2022 105  99 - 110 mmol/L Final    CO2 06/02/2022 19 (A) 21 - 32 mmol/L Final    Anion Gap 06/02/2022 17 (A) 3 - 16 Final    Glucose 06/02/2022 113 (A) 70 - 99 mg/dL Final BUN 06/02/2022 12  7 - 20 mg/dL Final    Creatinine 06/02/2022 0.6  0.6 - 1.1 mg/dL Final    GFR Non- 06/02/2022 >60  >60 Final    Comment: >60 mL/min/1.73m2 EGFR, calc. for ages 25 and older using the  MDRD formula (not corrected for weight), is valid for stable  renal function. GFR  06/02/2022 >60  >60 Final    Comment: Chronic Kidney Disease: less than 60 ml/min/1.73 sq.m. Kidney Failure: less than 15 ml/min/1.73 sq.m. Results valid for patients 18 years and older. Calcium 06/02/2022 9.6  8.3 - 10.6 mg/dL Final    Total Protein 06/02/2022 7.0  6.4 - 8.2 g/dL Final    Albumin 06/02/2022 4.8  3.4 - 5.0 g/dL Final    Albumin/Globulin Ratio 06/02/2022 2.2  1.1 - 2.2 Final    Total Bilirubin 06/02/2022 <0.2  0.0 - 1.0 mg/dL Final    Alkaline Phosphatase 06/02/2022 93  40 - 129 U/L Final    ALT 06/02/2022 11  10 - 40 U/L Final    AST 06/02/2022 12 (A) 15 - 37 U/L Final    Cholesterol, Total 06/02/2022 276 (A) 0 - 199 mg/dL Final    Triglycerides 06/02/2022 118  0 - 150 mg/dL Final    HDL 06/02/2022 63 (A) 40 - 60 mg/dL Final    LDL Calculated 06/02/2022 189 (A) <100 mg/dL Final    VLDL Cholesterol Calculated 06/02/2022 24  Not Established mg/dL Final     Family History   Problem Relation Age of Onset    Other Father         DIVERTICULITIS     Current Outpatient Medications   Medication Sig Dispense Refill    Norgestim-Eth Estrad Triphasic (TRINESSA, 28,) 0.18/0.215/0.25 MG-35 MCG TABS Take 1 tablet by mouth daily 90 tablet 0    amLODIPine (NORVASC) 10 MG tablet Take 1 tablet by mouth daily 30 tablet 5     No current facility-administered medications for this visit.      Allergies   Allergen Reactions    Augmentin [Amoxicillin-Pot Clavulanate]      Tingling, lip swelling,burning sensation - w/ amoxil also       Review of Systems    Objective:  /74 (Site: Left Upper Arm, Position: Sitting, Cuff Size: Medium Adult)   Pulse (!) 116   Ht 5' 3\" (1.6 m)   Wt 161 lb (73 kg)   SpO2 97%   BMI 28.52 kg/m²     BP Readings from Last 3 Encounters:   08/16/22 130/74   06/02/22 136/80   06/01/21 132/84       Pulse Readings from Last 3 Encounters:   08/16/22 (!) 116   06/02/22 80   06/01/21 90       Wt Readings from Last 3 Encounters:   08/16/22 161 lb (73 kg)   07/21/22 157 lb (71.2 kg)   06/02/22 159 lb (72.1 kg)       Physical Exam  Vitals and nursing note reviewed. Constitutional:       Appearance: She is well-developed. HENT:      Head: Normocephalic and atraumatic. Eyes:      General: No scleral icterus. Conjunctiva/sclera: Conjunctivae normal.   Pulmonary:      Effort: Pulmonary effort is normal.   Musculoskeletal:      Comments: No ttp heel   Skin:     General: Skin is warm and dry. Comments: Small pink papule right lateral low leg   Neurological:      Mental Status: She is alert and oriented to person, place, and time. Assessment/Plan:      Diagnosis Orders   1. Cervical cancer screening        2. Plantar fasciitis        3. Bug bite, initial encounter        4. Uterine leiomyoma, unspecified location        Unable to access cervix visually as fibroid pushing down on posterior lower wall of uterus shifting cervix high and anterior - blind sweep done but no further pap if unable to get good cells  Tca oint for ?  Bug bite -inflammation lower leg  Uterine fibroid d/w pt - wants to hold on gyn eval for lili presently unless worsens  Menopause dw/ pt - not there yet  Reviewed mmg - fibrocystic changes  Repeat pap done  Mmg reviewed  Stretching exercises / heel pad encouraged for plantar fascitiis on left  F/u if persistent issues    Lonnie Escobedo MD, MD  8/16/2022  10:59 AM

## 2022-09-02 RX ORDER — AMLODIPINE BESYLATE 10 MG/1
10 TABLET ORAL DAILY
Qty: 30 TABLET | Refills: 8 | Status: SHIPPED | OUTPATIENT
Start: 2022-09-02

## 2022-09-02 NOTE — TELEPHONE ENCOUNTER
LAST VISIT 08/16/2022 WITH DR GALLAGHER, NEXT VISIT NONE. [facility]       6/2/2022  2:18 PM - [facility] Incoming Lab Results From Soft (Epic Adt)    Component Value Flag Ref Range Units Status   Sodium 141   136 - 145 mmol/L Final   Potassium 4.5   3.5 - 5.1 mmol/L Final   Chloride 105   99 - 110 mmol/L Final   CO2 19   Low   21 - 32 mmol/L Final   Anion Gap 17   High   3 - 16  Final   Glucose 113   High   70 - 99 mg/dL Final   BUN 12   7 - 20 mg/dL Final   Creatinine 0.6   0.6 - 1.1 mg/dL Final   GFR Non- >60   >60  Final   Comment:   >60 mL/min/1.73m2 EGFR, calc. for ages 25 and older using the   MDRD formula (not corrected for weight), is valid for stable   renal function. GFR  >60   >60  Final   Comment:   Chronic Kidney Disease: less than 60 ml/min/1.73 sq.m. Kidney Failure: less than 15 ml/min/1.73 sq.m. Results valid for patients 18 years and older.     Calcium 9.6   8.3 - 10.6 mg/dL Final   Total Protein 7.0   6.4 - 8.2 g/dL Final   Albumin 4.8   3.4 - 5.0 g/dL Final   Albumin/Globulin Ratio 2.2   1.1 - 2.2  Final   Total Bilirubin <0.2   0.0 - 1.0 mg/dL Final   Alkaline Phosphatase 93   40 - 129 U/L Final   ALT 11   10 - 40 U/L Final   AST 12   Low   15 - 37 U/L Final

## 2022-10-18 DIAGNOSIS — Z30.41 ENCOUNTER FOR BIRTH CONTROL PILLS MAINTENANCE: ICD-10-CM

## 2022-10-18 RX ORDER — NORGESTIMATE AND ETHINYL ESTRADIOL 7DAYSX3 28
1 KIT ORAL DAILY
Qty: 90 TABLET | Refills: 0 | Status: SHIPPED | OUTPATIENT
Start: 2022-10-18

## 2022-12-28 DIAGNOSIS — Z30.41 ENCOUNTER FOR BIRTH CONTROL PILLS MAINTENANCE: ICD-10-CM

## 2022-12-28 RX ORDER — NORGESTIMATE AND ETHINYL ESTRADIOL 7DAYSX3 28
1 KIT ORAL DAILY
Qty: 90 TABLET | Refills: 0 | Status: SHIPPED | OUTPATIENT
Start: 2022-12-28

## 2023-03-23 DIAGNOSIS — Z30.41 ENCOUNTER FOR BIRTH CONTROL PILLS MAINTENANCE: ICD-10-CM

## 2023-03-24 RX ORDER — NORGESTIMATE AND ETHINYL ESTRADIOL 7DAYSX3 28
1 KIT ORAL DAILY
Qty: 90 TABLET | Refills: 0 | Status: SHIPPED | OUTPATIENT
Start: 2023-03-24

## 2023-05-02 ENCOUNTER — PATIENT MESSAGE (OUTPATIENT)
Dept: FAMILY MEDICINE CLINIC | Age: 58
End: 2023-05-02

## 2023-05-02 NOTE — TELEPHONE ENCOUNTER
From: Pebbles Infante  To: Dr. Bonifacio Archibald: 5/2/2023 12:38 PM EDT  Subject: Lupron    Do you think estrogen can help? Can you give the injection?

## 2023-05-16 DIAGNOSIS — N83.202 CYSTS OF BOTH OVARIES: ICD-10-CM

## 2023-05-16 DIAGNOSIS — N83.201 CYSTS OF BOTH OVARIES: ICD-10-CM

## 2023-05-16 DIAGNOSIS — D25.9 UTERINE LEIOMYOMA, UNSPECIFIED LOCATION: Primary | ICD-10-CM

## 2023-06-14 DIAGNOSIS — D25.9 UTERINE LEIOMYOMA, UNSPECIFIED LOCATION: ICD-10-CM

## 2023-06-14 LAB — CANCER AG125 SERPL-ACNC: 22.4 U/ML (ref 0–35)

## 2023-06-21 DIAGNOSIS — D25.9 UTERINE LEIOMYOMA, UNSPECIFIED LOCATION: ICD-10-CM

## 2023-06-28 ENCOUNTER — PATIENT MESSAGE (OUTPATIENT)
Dept: FAMILY MEDICINE CLINIC | Age: 58
End: 2023-06-28

## 2023-06-28 ENCOUNTER — OFFICE VISIT (OUTPATIENT)
Dept: GYNECOLOGY | Age: 58
End: 2023-06-28

## 2023-06-28 VITALS
WEIGHT: 167.2 LBS | SYSTOLIC BLOOD PRESSURE: 178 MMHG | DIASTOLIC BLOOD PRESSURE: 102 MMHG | BODY MASS INDEX: 29.62 KG/M2

## 2023-06-28 DIAGNOSIS — D25.9 UTERINE LEIOMYOMA, UNSPECIFIED LOCATION: Primary | ICD-10-CM

## 2023-06-28 DIAGNOSIS — N83.202 BILATERAL OVARIAN CYSTS: ICD-10-CM

## 2023-06-28 DIAGNOSIS — N83.201 BILATERAL OVARIAN CYSTS: ICD-10-CM

## 2023-06-28 PROCEDURE — 99214 OFFICE O/P EST MOD 30 MIN: CPT | Performed by: OBSTETRICS & GYNECOLOGY

## 2023-06-28 PROCEDURE — 3077F SYST BP >= 140 MM HG: CPT | Performed by: OBSTETRICS & GYNECOLOGY

## 2023-06-28 PROCEDURE — 3080F DIAST BP >= 90 MM HG: CPT | Performed by: OBSTETRICS & GYNECOLOGY

## 2023-06-28 RX ORDER — SODIUM CHLORIDE 0.9 % (FLUSH) 0.9 %
5-40 SYRINGE (ML) INJECTION PRN
OUTPATIENT
Start: 2023-06-28

## 2023-06-28 RX ORDER — SODIUM CHLORIDE 9 MG/ML
INJECTION, SOLUTION INTRAVENOUS PRN
OUTPATIENT
Start: 2023-06-28

## 2023-06-28 RX ORDER — SODIUM CHLORIDE 0.9 % (FLUSH) 0.9 %
5-40 SYRINGE (ML) INJECTION EVERY 12 HOURS SCHEDULED
OUTPATIENT
Start: 2023-06-28

## 2023-06-28 RX ORDER — BISOPROLOL FUMARATE AND HYDROCHLOROTHIAZIDE 5; 6.25 MG/1; MG/1
2 TABLET ORAL DAILY
Qty: 60 TABLET | Refills: 2 | Status: SHIPPED | OUTPATIENT
Start: 2023-06-28

## 2023-06-28 RX ORDER — SODIUM CHLORIDE, SODIUM LACTATE, POTASSIUM CHLORIDE, CALCIUM CHLORIDE 600; 310; 30; 20 MG/100ML; MG/100ML; MG/100ML; MG/100ML
INJECTION, SOLUTION INTRAVENOUS CONTINUOUS
OUTPATIENT
Start: 2023-06-28

## 2023-07-05 NOTE — PROGRESS NOTES
C-diff Questionnaire:     * Admitted with diarrhea? [] YES    [x]  NO     *Prior history of C-Diff. In last 3 months? [] YES    [x]  NO     *Antibiotic use in the past 6-8 weeks? [x]  NO    []  YES      If yes, which: REASON_________________     *Prior hospitalization or nursing home in the last month? []  YES    [x]  NO     SAFETY FIRST. .call before you fall    [address] time____600 7/20/23________        Surgery time____730________    Do not eat or drink anything after 12:00 midnight prior to your surgery. This includes water chewing gum, mints and ice chips- the Day of Surgery. You may brush your teeth and gargle the morning of your surgery, but do not swallow the water     Please see your family doctor/pediatrician for a history and physical and/or questions concerning medications. Bring any test results/reports from your physicians office. If you are under the care of a heart doctor or specialist doctor, please be aware that you may be asked to them for clearance    You may be asked to stop blood thinners such as Coumadin, Plavix, Fragmin, Lovenox, etc., or any anti-inflammatories such as:  Aspirin, Ibuprofen, Advil, Naproxen prior to your surgery. We also ask that you stop any OTC medications such as fish oil, vitamin E, glucosamine, garlic, Multivitamins, COQ 10, etc.    We ask that you do not smoke 24 hours prior to surgery  We ask that you do not  drink any alcoholic beverages 24 hours prior to surgery     You must make arrangements for a responsible adult to take you home after your surgery. For your safety you will not be allowed to leave alone or drive yourself home. Your surgery will be cancelled if you do not have a ride home. Also for your safety, it is strongly suggested that someone stay with you the first 24 hours after your surgery.      A parent or legal guardian must accompany a child scheduled for

## 2023-07-05 NOTE — PROGRESS NOTES
[facility] Pre-Admission Testing Electronic Communication Worksheet for OR/ENDO Procedures        Patient: Bárbara Crespo    DOS:  7/20/23    Arrival Time:  600    Surgery Time:  36    Meds to Bed:  [x] YES    []  NO    Transportation Confirmed: [x] YES    []  NO    History and Physical:  [x] YES per FLick    []  NO  [] N/A  If yes, please list doctor or Urgent Care and date of H&P:     Additional Clearance(Cardiac, Pulmonary, etc):  [] YES    [x]  NO    Pre-Admission Testing Visit:  [] YES    [x]  NO If no, do labs/testing need to be done DOS?   [] YES    []  NO    Medication Reconciliation Complete:  [x] YES    []  NO        Additional Notes:                Interview Complete: [x] YES    []  NO          Gutierrez Ingram RN  10:52 AM

## 2023-07-19 ENCOUNTER — ANESTHESIA EVENT (OUTPATIENT)
Dept: OPERATING ROOM | Age: 58
End: 2023-07-19
Payer: COMMERCIAL

## 2023-07-20 ENCOUNTER — HOSPITAL ENCOUNTER (INPATIENT)
Age: 58
LOS: 2 days | Discharge: HOME | DRG: 742 | End: 2023-07-22
Attending: OBSTETRICS & GYNECOLOGY | Admitting: OBSTETRICS & GYNECOLOGY
Payer: COMMERCIAL

## 2023-07-20 ENCOUNTER — ANESTHESIA (OUTPATIENT)
Dept: OPERATING ROOM | Age: 58
End: 2023-07-20
Payer: COMMERCIAL

## 2023-07-20 DIAGNOSIS — Z90.79 S/P TAH-BSO (TOTAL ABDOMINAL HYSTERECTOMY AND BILATERAL SALPINGO-OOPHORECTOMY): Primary | ICD-10-CM

## 2023-07-20 DIAGNOSIS — N83.8 BILATERAL TUBO-OVARIAN MASS: ICD-10-CM

## 2023-07-20 DIAGNOSIS — N85.2 ENLARGED UTERUS: ICD-10-CM

## 2023-07-20 DIAGNOSIS — Z90.710 S/P TAH-BSO (TOTAL ABDOMINAL HYSTERECTOMY AND BILATERAL SALPINGO-OOPHORECTOMY): Primary | ICD-10-CM

## 2023-07-20 DIAGNOSIS — Z90.722 S/P TAH-BSO (TOTAL ABDOMINAL HYSTERECTOMY AND BILATERAL SALPINGO-OOPHORECTOMY): Primary | ICD-10-CM

## 2023-07-20 LAB — HCG SERPL QL: NEGATIVE

## 2023-07-20 PROCEDURE — 6360000002 HC RX W HCPCS: Performed by: OBSTETRICS & GYNECOLOGY

## 2023-07-20 PROCEDURE — 6370000000 HC RX 637 (ALT 250 FOR IP): Performed by: OBSTETRICS & GYNECOLOGY

## 2023-07-20 PROCEDURE — 0UT20ZZ RESECTION OF BILATERAL OVARIES, OPEN APPROACH: ICD-10-PCS | Performed by: OBSTETRICS & GYNECOLOGY

## 2023-07-20 PROCEDURE — A4217 STERILE WATER/SALINE, 500 ML: HCPCS | Performed by: OBSTETRICS & GYNECOLOGY

## 2023-07-20 PROCEDURE — 2500000003 HC RX 250 WO HCPCS

## 2023-07-20 PROCEDURE — 2580000003 HC RX 258: Performed by: OBSTETRICS & GYNECOLOGY

## 2023-07-20 PROCEDURE — 7100000000 HC PACU RECOVERY - FIRST 15 MIN: Performed by: OBSTETRICS & GYNECOLOGY

## 2023-07-20 PROCEDURE — 2709999900 HC NON-CHARGEABLE SUPPLY: Performed by: OBSTETRICS & GYNECOLOGY

## 2023-07-20 PROCEDURE — 3700000000 HC ANESTHESIA ATTENDED CARE: Performed by: OBSTETRICS & GYNECOLOGY

## 2023-07-20 PROCEDURE — 88307 TISSUE EXAM BY PATHOLOGIST: CPT

## 2023-07-20 PROCEDURE — 3600000005 HC SURGERY LEVEL 5 BASE: Performed by: OBSTETRICS & GYNECOLOGY

## 2023-07-20 PROCEDURE — 84703 CHORIONIC GONADOTROPIN ASSAY: CPT

## 2023-07-20 PROCEDURE — 7100000001 HC PACU RECOVERY - ADDTL 15 MIN: Performed by: OBSTETRICS & GYNECOLOGY

## 2023-07-20 PROCEDURE — 6360000002 HC RX W HCPCS: Performed by: ANESTHESIOLOGY

## 2023-07-20 PROCEDURE — 6370000000 HC RX 637 (ALT 250 FOR IP)

## 2023-07-20 PROCEDURE — 2580000003 HC RX 258: Performed by: ANESTHESIOLOGY

## 2023-07-20 PROCEDURE — 0UT90ZZ RESECTION OF UTERUS, OPEN APPROACH: ICD-10-PCS | Performed by: OBSTETRICS & GYNECOLOGY

## 2023-07-20 PROCEDURE — 6360000002 HC RX W HCPCS

## 2023-07-20 PROCEDURE — 0UT70ZZ RESECTION OF BILATERAL FALLOPIAN TUBES, OPEN APPROACH: ICD-10-PCS | Performed by: OBSTETRICS & GYNECOLOGY

## 2023-07-20 PROCEDURE — 3600000015 HC SURGERY LEVEL 5 ADDTL 15MIN: Performed by: OBSTETRICS & GYNECOLOGY

## 2023-07-20 PROCEDURE — 58150 TOTAL HYSTERECTOMY: CPT | Performed by: OBSTETRICS & GYNECOLOGY

## 2023-07-20 PROCEDURE — 1200000000 HC SEMI PRIVATE

## 2023-07-20 PROCEDURE — 3700000001 HC ADD 15 MINUTES (ANESTHESIA): Performed by: OBSTETRICS & GYNECOLOGY

## 2023-07-20 PROCEDURE — C9399 UNCLASSIFIED DRUGS OR BIOLOG: HCPCS

## 2023-07-20 RX ORDER — APREPITANT 40 MG/1
40 CAPSULE ORAL ONCE
Status: COMPLETED | OUTPATIENT
Start: 2023-07-20 | End: 2023-07-20

## 2023-07-20 RX ORDER — LABETALOL HYDROCHLORIDE 5 MG/ML
5 INJECTION, SOLUTION INTRAVENOUS
Status: DISCONTINUED | OUTPATIENT
Start: 2023-07-20 | End: 2023-07-20 | Stop reason: HOSPADM

## 2023-07-20 RX ORDER — ROCURONIUM BROMIDE 10 MG/ML
INJECTION, SOLUTION INTRAVENOUS PRN
Status: DISCONTINUED | OUTPATIENT
Start: 2023-07-20 | End: 2023-07-20 | Stop reason: SDUPTHER

## 2023-07-20 RX ORDER — PROPOFOL 10 MG/ML
INJECTION, EMULSION INTRAVENOUS PRN
Status: DISCONTINUED | OUTPATIENT
Start: 2023-07-20 | End: 2023-07-20 | Stop reason: SDUPTHER

## 2023-07-20 RX ORDER — SODIUM CHLORIDE 0.9 % (FLUSH) 0.9 %
5-40 SYRINGE (ML) INJECTION EVERY 12 HOURS SCHEDULED
Status: DISCONTINUED | OUTPATIENT
Start: 2023-07-20 | End: 2023-07-20 | Stop reason: SDUPTHER

## 2023-07-20 RX ORDER — CEFAZOLIN SODIUM 1 G/3ML
INJECTION, POWDER, FOR SOLUTION INTRAMUSCULAR; INTRAVENOUS PRN
Status: DISCONTINUED | OUTPATIENT
Start: 2023-07-20 | End: 2023-07-20 | Stop reason: SDUPTHER

## 2023-07-20 RX ORDER — ONDANSETRON 4 MG/1
4 TABLET, ORALLY DISINTEGRATING ORAL EVERY 8 HOURS PRN
Status: DISCONTINUED | OUTPATIENT
Start: 2023-07-20 | End: 2023-07-22 | Stop reason: HOSPADM

## 2023-07-20 RX ORDER — SODIUM CHLORIDE 9 MG/ML
INJECTION, SOLUTION INTRAVENOUS PRN
Status: DISCONTINUED | OUTPATIENT
Start: 2023-07-20 | End: 2023-07-20 | Stop reason: HOSPADM

## 2023-07-20 RX ORDER — AMLODIPINE BESYLATE 10 MG/1
10 TABLET ORAL DAILY
Status: DISCONTINUED | OUTPATIENT
Start: 2023-07-20 | End: 2023-07-22 | Stop reason: HOSPADM

## 2023-07-20 RX ORDER — DIPHENHYDRAMINE HYDROCHLORIDE 50 MG/ML
12.5 INJECTION INTRAMUSCULAR; INTRAVENOUS
Status: DISCONTINUED | OUTPATIENT
Start: 2023-07-20 | End: 2023-07-20 | Stop reason: HOSPADM

## 2023-07-20 RX ORDER — ONDANSETRON 2 MG/ML
4 INJECTION INTRAMUSCULAR; INTRAVENOUS
Status: DISCONTINUED | OUTPATIENT
Start: 2023-07-20 | End: 2023-07-20 | Stop reason: HOSPADM

## 2023-07-20 RX ORDER — ALBUTEROL SULFATE 90 UG/1
AEROSOL, METERED RESPIRATORY (INHALATION) PRN
Status: DISCONTINUED | OUTPATIENT
Start: 2023-07-20 | End: 2023-07-20 | Stop reason: SDUPTHER

## 2023-07-20 RX ORDER — SODIUM CHLORIDE 0.9 % (FLUSH) 0.9 %
5-40 SYRINGE (ML) INJECTION EVERY 12 HOURS SCHEDULED
Status: DISCONTINUED | OUTPATIENT
Start: 2023-07-20 | End: 2023-07-20 | Stop reason: HOSPADM

## 2023-07-20 RX ORDER — METOPROLOL SUCCINATE 50 MG/1
100 TABLET, EXTENDED RELEASE ORAL DAILY
Status: DISCONTINUED | OUTPATIENT
Start: 2023-07-20 | End: 2023-07-22 | Stop reason: HOSPADM

## 2023-07-20 RX ORDER — FENTANYL CITRATE 50 UG/ML
25 INJECTION, SOLUTION INTRAMUSCULAR; INTRAVENOUS EVERY 5 MIN PRN
Status: DISCONTINUED | OUTPATIENT
Start: 2023-07-20 | End: 2023-07-20 | Stop reason: HOSPADM

## 2023-07-20 RX ORDER — EPHEDRINE SULFATE/0.9% NACL/PF 50 MG/5 ML
SYRINGE (ML) INTRAVENOUS PRN
Status: DISCONTINUED | OUTPATIENT
Start: 2023-07-20 | End: 2023-07-20 | Stop reason: SDUPTHER

## 2023-07-20 RX ORDER — ONDANSETRON 2 MG/ML
4 INJECTION INTRAMUSCULAR; INTRAVENOUS EVERY 6 HOURS PRN
Status: DISCONTINUED | OUTPATIENT
Start: 2023-07-20 | End: 2023-07-22 | Stop reason: HOSPADM

## 2023-07-20 RX ORDER — GLYCOPYRROLATE 0.2 MG/ML
INJECTION INTRAMUSCULAR; INTRAVENOUS PRN
Status: DISCONTINUED | OUTPATIENT
Start: 2023-07-20 | End: 2023-07-20 | Stop reason: SDUPTHER

## 2023-07-20 RX ORDER — FAMOTIDINE 10 MG/ML
INJECTION, SOLUTION INTRAVENOUS PRN
Status: DISCONTINUED | OUTPATIENT
Start: 2023-07-20 | End: 2023-07-20 | Stop reason: SDUPTHER

## 2023-07-20 RX ORDER — SODIUM CHLORIDE 9 MG/ML
INJECTION, SOLUTION INTRAVENOUS PRN
Status: DISCONTINUED | OUTPATIENT
Start: 2023-07-20 | End: 2023-07-22 | Stop reason: HOSPADM

## 2023-07-20 RX ORDER — SUCCINYLCHOLINE/SOD CL,ISO/PF 200MG/10ML
SYRINGE (ML) INTRAVENOUS PRN
Status: DISCONTINUED | OUTPATIENT
Start: 2023-07-20 | End: 2023-07-20 | Stop reason: SDUPTHER

## 2023-07-20 RX ORDER — SODIUM CHLORIDE 9 MG/ML
INJECTION, SOLUTION INTRAVENOUS PRN
Status: DISCONTINUED | OUTPATIENT
Start: 2023-07-20 | End: 2023-07-20 | Stop reason: SDUPTHER

## 2023-07-20 RX ORDER — FENTANYL CITRATE 50 UG/ML
INJECTION, SOLUTION INTRAMUSCULAR; INTRAVENOUS PRN
Status: DISCONTINUED | OUTPATIENT
Start: 2023-07-20 | End: 2023-07-20 | Stop reason: SDUPTHER

## 2023-07-20 RX ORDER — DEXAMETHASONE SODIUM PHOSPHATE 4 MG/ML
INJECTION, SOLUTION INTRA-ARTICULAR; INTRALESIONAL; INTRAMUSCULAR; INTRAVENOUS; SOFT TISSUE PRN
Status: DISCONTINUED | OUTPATIENT
Start: 2023-07-20 | End: 2023-07-20 | Stop reason: SDUPTHER

## 2023-07-20 RX ORDER — MORPHINE SULFATE/0.9% NACL/PF 1 MG/ML
SYRINGE (ML) INJECTION CONTINUOUS
Status: DISCONTINUED | OUTPATIENT
Start: 2023-07-20 | End: 2023-07-21

## 2023-07-20 RX ORDER — SODIUM CHLORIDE 0.9 % (FLUSH) 0.9 %
5-40 SYRINGE (ML) INJECTION EVERY 12 HOURS SCHEDULED
Status: DISCONTINUED | OUTPATIENT
Start: 2023-07-20 | End: 2023-07-22 | Stop reason: HOSPADM

## 2023-07-20 RX ORDER — MEPERIDINE HYDROCHLORIDE 25 MG/ML
12.5 INJECTION INTRAMUSCULAR; INTRAVENOUS; SUBCUTANEOUS EVERY 5 MIN PRN
Status: DISCONTINUED | OUTPATIENT
Start: 2023-07-20 | End: 2023-07-20 | Stop reason: HOSPADM

## 2023-07-20 RX ORDER — SODIUM CHLORIDE 0.9 % (FLUSH) 0.9 %
5-40 SYRINGE (ML) INJECTION PRN
Status: DISCONTINUED | OUTPATIENT
Start: 2023-07-20 | End: 2023-07-22 | Stop reason: HOSPADM

## 2023-07-20 RX ORDER — LIDOCAINE HYDROCHLORIDE 20 MG/ML
INJECTION, SOLUTION EPIDURAL; INFILTRATION; INTRACAUDAL; PERINEURAL PRN
Status: DISCONTINUED | OUTPATIENT
Start: 2023-07-20 | End: 2023-07-20 | Stop reason: SDUPTHER

## 2023-07-20 RX ORDER — SODIUM CHLORIDE 0.9 % (FLUSH) 0.9 %
5-40 SYRINGE (ML) INJECTION PRN
Status: DISCONTINUED | OUTPATIENT
Start: 2023-07-20 | End: 2023-07-20 | Stop reason: SDUPTHER

## 2023-07-20 RX ORDER — SODIUM CHLORIDE, SODIUM LACTATE, POTASSIUM CHLORIDE, CALCIUM CHLORIDE 600; 310; 30; 20 MG/100ML; MG/100ML; MG/100ML; MG/100ML
INJECTION, SOLUTION INTRAVENOUS CONTINUOUS
Status: DISCONTINUED | OUTPATIENT
Start: 2023-07-20 | End: 2023-07-20 | Stop reason: HOSPADM

## 2023-07-20 RX ORDER — MIDAZOLAM HYDROCHLORIDE 1 MG/ML
INJECTION INTRAMUSCULAR; INTRAVENOUS PRN
Status: DISCONTINUED | OUTPATIENT
Start: 2023-07-20 | End: 2023-07-20 | Stop reason: SDUPTHER

## 2023-07-20 RX ORDER — BISOPROLOL FUMARATE AND HYDROCHLOROTHIAZIDE 5; 6.25 MG/1; MG/1
2 TABLET ORAL DAILY
Status: DISCONTINUED | OUTPATIENT
Start: 2023-07-20 | End: 2023-07-20 | Stop reason: CLARIF

## 2023-07-20 RX ORDER — SODIUM CHLORIDE, SODIUM LACTATE, POTASSIUM CHLORIDE, CALCIUM CHLORIDE 600; 310; 30; 20 MG/100ML; MG/100ML; MG/100ML; MG/100ML
INJECTION, SOLUTION INTRAVENOUS CONTINUOUS
Status: DISCONTINUED | OUTPATIENT
Start: 2023-07-20 | End: 2023-07-21

## 2023-07-20 RX ORDER — POLYETHYLENE GLYCOL 3350 17 G/17G
17 POWDER, FOR SOLUTION ORAL DAILY PRN
Status: DISCONTINUED | OUTPATIENT
Start: 2023-07-20 | End: 2023-07-22 | Stop reason: HOSPADM

## 2023-07-20 RX ORDER — NALOXONE HYDROCHLORIDE 0.4 MG/ML
INJECTION, SOLUTION INTRAMUSCULAR; INTRAVENOUS; SUBCUTANEOUS PRN
Status: DISCONTINUED | OUTPATIENT
Start: 2023-07-20 | End: 2023-07-22 | Stop reason: HOSPADM

## 2023-07-20 RX ORDER — SODIUM CHLORIDE 0.9 % (FLUSH) 0.9 %
5-40 SYRINGE (ML) INJECTION PRN
Status: DISCONTINUED | OUTPATIENT
Start: 2023-07-20 | End: 2023-07-20 | Stop reason: HOSPADM

## 2023-07-20 RX ORDER — MAGNESIUM HYDROXIDE 1200 MG/15ML
LIQUID ORAL CONTINUOUS PRN
Status: COMPLETED | OUTPATIENT
Start: 2023-07-20 | End: 2023-07-20

## 2023-07-20 RX ORDER — PHENYLEPHRINE HCL IN 0.9% NACL 1 MG/10 ML
SYRINGE (ML) INTRAVENOUS PRN
Status: DISCONTINUED | OUTPATIENT
Start: 2023-07-20 | End: 2023-07-20 | Stop reason: SDUPTHER

## 2023-07-20 RX ORDER — ONDANSETRON 2 MG/ML
INJECTION INTRAMUSCULAR; INTRAVENOUS PRN
Status: DISCONTINUED | OUTPATIENT
Start: 2023-07-20 | End: 2023-07-20 | Stop reason: SDUPTHER

## 2023-07-20 RX ADMIN — AMLODIPINE BESYLATE 10 MG: 10 TABLET ORAL at 15:20

## 2023-07-20 RX ADMIN — HYDROMORPHONE HYDROCHLORIDE 0.5 MG: 1 INJECTION, SOLUTION INTRAMUSCULAR; INTRAVENOUS; SUBCUTANEOUS at 08:53

## 2023-07-20 RX ADMIN — ROCURONIUM BROMIDE 25 MG: 10 INJECTION INTRAVENOUS at 07:43

## 2023-07-20 RX ADMIN — FENTANYL CITRATE 50 MCG: 50 INJECTION INTRAMUSCULAR; INTRAVENOUS at 08:10

## 2023-07-20 RX ADMIN — HYDROMORPHONE HYDROCHLORIDE 0.5 MG: 1 INJECTION, SOLUTION INTRAMUSCULAR; INTRAVENOUS; SUBCUTANEOUS at 09:30

## 2023-07-20 RX ADMIN — Medication 50 MCG: at 08:38

## 2023-07-20 RX ADMIN — ROCURONIUM BROMIDE 10 MG: 10 INJECTION INTRAVENOUS at 08:34

## 2023-07-20 RX ADMIN — SODIUM CHLORIDE, POTASSIUM CHLORIDE, SODIUM LACTATE AND CALCIUM CHLORIDE: 600; 310; 30; 20 INJECTION, SOLUTION INTRAVENOUS at 15:41

## 2023-07-20 RX ADMIN — GLYCOPYRROLATE 0.1 MG: 0.2 INJECTION INTRAMUSCULAR; INTRAVENOUS at 07:46

## 2023-07-20 RX ADMIN — Medication 5 MG: at 08:04

## 2023-07-20 RX ADMIN — DEXAMETHASONE SODIUM PHOSPHATE 8 MG: 4 INJECTION, SOLUTION INTRAMUSCULAR; INTRAVENOUS at 07:43

## 2023-07-20 RX ADMIN — FENTANYL CITRATE 50 MCG: 50 INJECTION INTRAMUSCULAR; INTRAVENOUS at 07:37

## 2023-07-20 RX ADMIN — Medication 50 MCG: at 08:26

## 2023-07-20 RX ADMIN — Medication 100 MCG: at 08:05

## 2023-07-20 RX ADMIN — SUGAMMADEX 100 MG: 100 INJECTION, SOLUTION INTRAVENOUS at 09:27

## 2023-07-20 RX ADMIN — METOPROLOL SUCCINATE 100 MG: 50 TABLET, EXTENDED RELEASE ORAL at 15:20

## 2023-07-20 RX ADMIN — Medication 100 MG: at 07:37

## 2023-07-20 RX ADMIN — MIDAZOLAM 2 MG: 1 INJECTION INTRAMUSCULAR; INTRAVENOUS at 07:32

## 2023-07-20 RX ADMIN — SUGAMMADEX 100 MG: 100 INJECTION, SOLUTION INTRAVENOUS at 09:30

## 2023-07-20 RX ADMIN — GLYCOPYRROLATE 0.2 MG: 0.2 INJECTION INTRAMUSCULAR; INTRAVENOUS at 08:05

## 2023-07-20 RX ADMIN — MORPHINE SULFATE: 1 INJECTION INTRAVENOUS at 10:46

## 2023-07-20 RX ADMIN — LIDOCAINE HYDROCHLORIDE 60 MG: 20 INJECTION, SOLUTION EPIDURAL; INFILTRATION; INTRACAUDAL; PERINEURAL at 07:37

## 2023-07-20 RX ADMIN — PROPOFOL 120 MG: 10 INJECTION, EMULSION INTRAVENOUS at 07:37

## 2023-07-20 RX ADMIN — APREPITANT 40 MG: 40 CAPSULE ORAL at 07:01

## 2023-07-20 RX ADMIN — FAMOTIDINE 20 MG: 10 INJECTION, SOLUTION INTRAVENOUS at 07:32

## 2023-07-20 RX ADMIN — ALBUTEROL SULFATE 2 PUFF: 90 AEROSOL, METERED RESPIRATORY (INHALATION) at 09:15

## 2023-07-20 RX ADMIN — ONDANSETRON 4 MG: 2 INJECTION INTRAMUSCULAR; INTRAVENOUS at 08:57

## 2023-07-20 RX ADMIN — CEFAZOLIN 2 G: 1 INJECTION, POWDER, FOR SOLUTION INTRAMUSCULAR; INTRAVENOUS at 07:43

## 2023-07-20 RX ADMIN — ROCURONIUM BROMIDE 5 MG: 10 INJECTION INTRAVENOUS at 07:37

## 2023-07-20 RX ADMIN — FENTANYL CITRATE 25 MCG: 50 INJECTION, SOLUTION INTRAMUSCULAR; INTRAVENOUS at 10:06

## 2023-07-20 RX ADMIN — Medication 5 MG: at 08:35

## 2023-07-20 RX ADMIN — ROCURONIUM BROMIDE 20 MG: 10 INJECTION INTRAVENOUS at 08:02

## 2023-07-20 RX ADMIN — SODIUM CHLORIDE: 9 INJECTION, SOLUTION INTRAVENOUS at 07:15

## 2023-07-20 RX ADMIN — ONDANSETRON 4 MG: 4 TABLET, ORALLY DISINTEGRATING ORAL at 19:02

## 2023-07-20 RX ADMIN — SODIUM CHLORIDE: 9 INJECTION, SOLUTION INTRAVENOUS at 09:30

## 2023-07-20 RX ADMIN — GLYCOPYRROLATE 0.1 MG: 0.2 INJECTION INTRAMUSCULAR; INTRAVENOUS at 07:37

## 2023-07-20 RX ADMIN — MORPHINE SULFATE: 1 INJECTION INTRAVENOUS at 16:05

## 2023-07-20 RX ADMIN — ALBUTEROL SULFATE 2 PUFF: 90 AEROSOL, METERED RESPIRATORY (INHALATION) at 09:05

## 2023-07-20 ASSESSMENT — PAIN SCALES - GENERAL
PAINLEVEL_OUTOF10: 3
PAINLEVEL_OUTOF10: 0
PAINLEVEL_OUTOF10: 4
PAINLEVEL_OUTOF10: 2
PAINLEVEL_OUTOF10: 3

## 2023-07-20 ASSESSMENT — LIFESTYLE VARIABLES: SMOKING_STATUS: 1

## 2023-07-20 ASSESSMENT — ENCOUNTER SYMPTOMS: SHORTNESS OF BREATH: 0

## 2023-07-20 NOTE — BRIEF OP NOTE
Brief Postoperative Note      Patient: Hazel Aquino  YOB: 1965  MRN: [id number]    Date of Procedure: 7/20/2023    Pre-Op Diagnosis Codes:     * Enlarged uterus [N85.2]     * Bilateral tubo-ovarian mass [N83.8]    Post-Op Diagnosis: Same       Procedure(s):  TOTAL ABDOMINAL HYSTERECTOMY BILATERAL SALPINGO OOPHORECTOMY, VERTICAL SKIN    Surgeon(s):  Samy Sellers MD    Assistant:  Surgical Assistant: Lan Neri    Anesthesia: General    Estimated Blood Loss (mL): less than 258     Complications: None    Specimens:   ID Type Source Tests Collected by Time Destination   A : A. cervix, uterus, bilateral fallopian tubes and ovaries Tissue Tissue SURGICAL PATHOLOGY Samy Sellers MD 4/38/1842 5776    B : B. fibroids Tissue Tissue SURGICAL PATHOLOGY Samy Sellers MD 9/27/9794 5631        Implants:  * No implants in log *      Drains:   Urinary Catheter 07/20/23 [address] Way (Active)       Findings: fibroid uterus, left paratubal cyst, left ovarian dermoid      Electronically signed by Samy Sellers MD on 3/88/2809 at 9:28 AM

## 2023-07-20 NOTE — PROGRESS NOTES
Pt admitted to [facility] - acclimated pt to room and call light       Pt AOX4 - pt denied n/v, sob, diarrhea - pt c/o 3/10 pain - pt denied any further needs at this time - bed in lowest and locked position with call light and bed side table within reach - non skid socks and VIKI on

## 2023-07-20 NOTE — OP NOTE
[address], [address] [place] [address] Way                                OPERATIVE REPORT    PATIENT NAME: Karo Mireles                    :        1965  MED REC NO:   [id number]                          ROOM:  ACCOUNT NO:   [de-identified]                           ADMIT DATE: 2023  PROVIDER:     Payton Kenyon MD    DATE OF PROCEDURE:  2023    PREOPERATIVE DIAGNOSES:  Enlarged uterus, bilateral tubo-ovarian masses. POSTOPERATIVE DIAGNOSES:  Enlarged uterus, bilateral tubo-ovarian  masses. OPERATION PERFORMED:  Total abdominal hysterectomy bilateral  salpingo-oophorectomy, vertical skin incision. SURGEON:  Robby Kenyon MD    ANESTHETIC:  General endotracheal anesthetic. ESTIMATED BLOOD LOSS:  100 mL. PATHOLOGY:  Uterus, cervix, bilateral ovaries, and tubes. FINDINGS:  Fibroid uterus, normal right ovary and tube, enlarged left  paratubal cyst, and enlarged left dermoid cyst.    DISPOSITION  Stable to recovery room. INDICATIONS:  The patient is a 51-year-old female. She is  3,  para 3, presenting with an enlarged pelvic mass. MRI was performed  showing an enlarged size of the uterus and ovaries. CA-125 was normal.   I recommended a total abdominal hysterectomy with bilateral  salpingo-oophorectomy and discussed the technique, the recovery and the  risks with the patient. They include but are not limited to bleeding,  infection, damage to her internal organs such as bladder and bowel and  ureters with need for subsequent reparative surgery if damage occurred. The patient voiced understanding and agreed to proceed with the surgery. PROCEDURE:  The patient was taken to the operating room, was prepped and  draped in normal sterile fashion in the dorsal supine position. A  vertical skin incision was made with a scalpel. It was carried through  to underlying layer of fascia.   The fascia

## 2023-07-20 NOTE — PROGRESS NOTES
Replaced PCA Morphine syringe - 0 ml wasted with Danica Taylor RN    Electronically signed by Ileana Fisher RN on 7/20/2023 at 4:11 PM

## 2023-07-20 NOTE — ANESTHESIA POSTPROCEDURE EVALUATION
Department of Anesthesiology  Postprocedure Note    Patient: Yvan Brown  MRN: [id number]  YOB: 1965  Date of evaluation: 7/20/2023      Procedure Summary     Date: 07/20/23 Room / Location: [address]    Anesthesia Start: 0732 Anesthesia Stop: 2324    Procedure: TOTAL ABDOMINAL HYSTERECTOMY BILATERAL SALPINGO OOPHORECTOMY, VERTICAL SKIN (Bilateral: Abdomen/Perineum) Diagnosis:       Enlarged uterus      Bilateral tubo-ovarian mass      (Enlarged uterus [N85.2])      (Bilateral tubo-ovarian mass [N83.8])    Surgeons: Brandon Holloway MD Responsible Provider: Scott Woodson MD    Anesthesia Type: general ASA Status: 2          Anesthesia Type: No value filed.     Gerson Phase I: Gerson Score: 10    Gerson Phase II:        Anesthesia Post Evaluation    Patient location during evaluation: PACU  Patient participation: complete - patient participated  Level of consciousness: awake  Airway patency: patent  Nausea & Vomiting: no nausea  Complications: no  Cardiovascular status: hemodynamically stable  Respiratory status: acceptable  Hydration status: euvolemic  Multimodal analgesia pain management approach

## 2023-07-20 NOTE — H&P
Date of Surgery Update:  Lang Hernandez was seen, history and physical examination reviewed, and patient examined by me today. There have been no significant clinical changes since the completion of the previous history and physical.    The risk, benefits, and alternatives of the proposed procedure have been explained to the patient (or appropriate guardian) and understanding verbalized. All questions answered. Patient wishes to proceed.     Electronically signed by: KELLY Padilla,2/43/6981,0:85 AM

## 2023-07-20 NOTE — PLAN OF CARE
Problem: Discharge Planning  Goal: Discharge to home or other facility with appropriate resources  7/20/2023 1949 by aRmiro Green RN  Outcome: Progressing  7/20/2023 1601 by Sin Lewis RN  Outcome: Progressing     Problem: ABCDS Injury Assessment  Goal: Absence of physical injury  7/20/2023 1949 by Ramiro Green RN  Outcome: Progressing  7/20/2023 1601 by Sin Lewis RN  Outcome: Progressing     Problem: Pain  Goal: Verbalizes/displays adequate comfort level or baseline comfort level  7/20/2023 1949 by Ramiro Green RN  Outcome: Progressing  7/20/2023 1601 by Sin Lewis RN  Outcome: Progressing     Problem: Safety - Adult  Goal: Free from fall injury  Outcome: Progressing

## 2023-07-20 NOTE — PROGRESS NOTES
Intraop skin prep count correct, verified by Gaston Nicolas RN, and Afshin Sanchez. Vaginal sweep performed by jamison oquendo at end of case. No foreign objects or vaginal tears, noted.

## 2023-07-20 NOTE — PROGRESS NOTES
Pt to PACU 11. VSS. Drowsy. Abd binder in place with CDI dressing to abdomen. Jennings in place with output. Pt is drowsy.

## 2023-07-21 LAB
ABO + RH BLD: NORMAL
BLD GP AB SCN SERPL QL: NORMAL
BLOOD GROUP ANTIBODIES SERPL: NORMAL
DAT IGG CAPTURE: NORMAL
DEPRECATED RDW RBC AUTO: 18.6 % (ref 12.4–15.4)
HCT VFR BLD AUTO: 22.4 % (ref 36–48)
HGB BLD-MCNC: 6.7 G/DL (ref 12–16)
MCH RBC QN AUTO: 17.4 PG (ref 26–34)
MCHC RBC AUTO-ENTMCNC: 29.9 G/DL (ref 31–36)
MCV RBC AUTO: 58.2 FL (ref 80–100)
PATH INTERP BLD-IMP: NORMAL
PATH INTERP BLD-IMP: YES
PLATELET # BLD AUTO: 431 K/UL (ref 135–450)
PMV BLD AUTO: 8.7 FL (ref 5–10.5)
RBC # BLD AUTO: 3.86 M/UL (ref 4–5.2)
WBC # BLD AUTO: 13.9 K/UL (ref 4–11)

## 2023-07-21 PROCEDURE — 2580000003 HC RX 258: Performed by: OBSTETRICS & GYNECOLOGY

## 2023-07-21 PROCEDURE — 1200000000 HC SEMI PRIVATE

## 2023-07-21 PROCEDURE — 86900 BLOOD TYPING SEROLOGIC ABO: CPT

## 2023-07-21 PROCEDURE — 36430 TRANSFUSION BLD/BLD COMPNT: CPT

## 2023-07-21 PROCEDURE — 36415 COLL VENOUS BLD VENIPUNCTURE: CPT

## 2023-07-21 PROCEDURE — 86922 COMPATIBILITY TEST ANTIGLOB: CPT

## 2023-07-21 PROCEDURE — 86850 RBC ANTIBODY SCREEN: CPT

## 2023-07-21 PROCEDURE — 6370000000 HC RX 637 (ALT 250 FOR IP): Performed by: OBSTETRICS & GYNECOLOGY

## 2023-07-21 PROCEDURE — 86870 RBC ANTIBODY IDENTIFICATION: CPT

## 2023-07-21 PROCEDURE — P9016 RBC LEUKOCYTES REDUCED: HCPCS

## 2023-07-21 PROCEDURE — 94760 N-INVAS EAR/PLS OXIMETRY 1: CPT

## 2023-07-21 PROCEDURE — 30233N1 TRANSFUSION OF NONAUTOLOGOUS RED BLOOD CELLS INTO PERIPHERAL VEIN, PERCUTANEOUS APPROACH: ICD-10-PCS | Performed by: OBSTETRICS & GYNECOLOGY

## 2023-07-21 PROCEDURE — 6360000002 HC RX W HCPCS: Performed by: OBSTETRICS & GYNECOLOGY

## 2023-07-21 PROCEDURE — 86880 COOMBS TEST DIRECT: CPT

## 2023-07-21 PROCEDURE — 86901 BLOOD TYPING SEROLOGIC RH(D): CPT

## 2023-07-21 PROCEDURE — 85027 COMPLETE CBC AUTOMATED: CPT

## 2023-07-21 RX ORDER — OXYCODONE HYDROCHLORIDE AND ACETAMINOPHEN 5; 325 MG/1; MG/1
1 TABLET ORAL EVERY 4 HOURS PRN
Status: DISCONTINUED | OUTPATIENT
Start: 2023-07-21 | End: 2023-07-22 | Stop reason: HOSPADM

## 2023-07-21 RX ORDER — SODIUM CHLORIDE 9 MG/ML
INJECTION, SOLUTION INTRAVENOUS PRN
Status: DISCONTINUED | OUTPATIENT
Start: 2023-07-21 | End: 2023-07-22 | Stop reason: HOSPADM

## 2023-07-21 RX ADMIN — MORPHINE SULFATE: 1 INJECTION INTRAVENOUS at 02:28

## 2023-07-21 RX ADMIN — SODIUM CHLORIDE, PRESERVATIVE FREE 10 ML: 5 INJECTION INTRAVENOUS at 21:41

## 2023-07-21 RX ADMIN — OXYCODONE AND ACETAMINOPHEN 1 TABLET: 5; 325 TABLET ORAL at 20:57

## 2023-07-21 RX ADMIN — SODIUM CHLORIDE, POTASSIUM CHLORIDE, SODIUM LACTATE AND CALCIUM CHLORIDE: 600; 310; 30; 20 INJECTION, SOLUTION INTRAVENOUS at 08:46

## 2023-07-21 RX ADMIN — AMLODIPINE BESYLATE 10 MG: 10 TABLET ORAL at 08:27

## 2023-07-21 RX ADMIN — METOPROLOL SUCCINATE 100 MG: 50 TABLET, EXTENDED RELEASE ORAL at 08:26

## 2023-07-21 RX ADMIN — MORPHINE SULFATE: 1 INJECTION INTRAVENOUS at 13:53

## 2023-07-21 ASSESSMENT — PAIN SCALES - GENERAL
PAINLEVEL_OUTOF10: 1
PAINLEVEL_OUTOF10: 5

## 2023-07-21 ASSESSMENT — PAIN DESCRIPTION - LOCATION: LOCATION: ABDOMEN

## 2023-07-21 NOTE — PROGRESS NOTES
Pts feeling better after surgery. No n/v.  +flatus.     Af, vss  Abd- incision intact and no erythema, approp tender  Ext- nt, no edema  H/h- 6.7/22.4  Assess:  POD 1 s/p tahbso, acute anemia  Plan:  transfuse 2 units prbcs, repeat cbc, change to po analgesia

## 2023-07-21 NOTE — PROGRESS NOTES
Pt AOX4 - pt denied n/v, sob, diarrhea, pain - pt denied any further needs at this time - bed in lowest and locked position with call light and bed side table within reach - non skid socks on

## 2023-07-22 VITALS
HEART RATE: 96 BPM | WEIGHT: 162.26 LBS | SYSTOLIC BLOOD PRESSURE: 157 MMHG | DIASTOLIC BLOOD PRESSURE: 88 MMHG | RESPIRATION RATE: 16 BRPM | HEIGHT: 62 IN | TEMPERATURE: 98.1 F | BODY MASS INDEX: 29.86 KG/M2 | OXYGEN SATURATION: 94 %

## 2023-07-22 LAB
BLOOD BANK DISPENSE STATUS: NORMAL
BLOOD BANK DISPENSE STATUS: NORMAL
BLOOD BANK PRODUCT CODE: NORMAL
BLOOD BANK PRODUCT CODE: NORMAL
BPU ID: NORMAL
BPU ID: NORMAL
DESCRIPTION BLOOD BANK: NORMAL
DESCRIPTION BLOOD BANK: NORMAL
HCT VFR BLD AUTO: 29.8 % (ref 36–48)
HGB BLD-MCNC: 9.3 G/DL (ref 12–16)

## 2023-07-22 PROCEDURE — 6370000000 HC RX 637 (ALT 250 FOR IP): Performed by: OBSTETRICS & GYNECOLOGY

## 2023-07-22 PROCEDURE — 36415 COLL VENOUS BLD VENIPUNCTURE: CPT

## 2023-07-22 PROCEDURE — 85018 HEMOGLOBIN: CPT

## 2023-07-22 PROCEDURE — 36430 TRANSFUSION BLD/BLD COMPNT: CPT

## 2023-07-22 PROCEDURE — 94760 N-INVAS EAR/PLS OXIMETRY 1: CPT

## 2023-07-22 PROCEDURE — 2580000003 HC RX 258: Performed by: OBSTETRICS & GYNECOLOGY

## 2023-07-22 PROCEDURE — 85014 HEMATOCRIT: CPT

## 2023-07-22 RX ORDER — OXYCODONE HYDROCHLORIDE AND ACETAMINOPHEN 5; 325 MG/1; MG/1
1 TABLET ORAL EVERY 4 HOURS PRN
Qty: 40 TABLET | Refills: 0 | Status: SHIPPED | OUTPATIENT
Start: 2023-07-22 | End: 2023-07-29

## 2023-07-22 RX ADMIN — SODIUM CHLORIDE, PRESERVATIVE FREE 10 ML: 5 INJECTION INTRAVENOUS at 10:21

## 2023-07-22 RX ADMIN — POLYETHYLENE GLYCOL 3350 17 G: 17 POWDER, FOR SOLUTION ORAL at 04:18

## 2023-07-22 RX ADMIN — AMLODIPINE BESYLATE 10 MG: 10 TABLET ORAL at 10:21

## 2023-07-22 RX ADMIN — METOPROLOL SUCCINATE 100 MG: 50 TABLET, EXTENDED RELEASE ORAL at 10:21

## 2023-07-22 NOTE — PROGRESS NOTES
Pt educated on discharge instructions and follow-up appointments. Pt states no further questions at this time. Pt IVs removed with no complications. Pt escorted to lobby by RN, and transported home by .    Electronically signed by Tomasa Denver, RN on 7/22/2023 at 3:37 PM

## 2023-07-22 NOTE — PLAN OF CARE
Problem: Discharge Planning  Goal: Discharge to home or other facility with appropriate resources  7/22/2023 0105 by Christopher Vidal RN  Outcome: Progressing  7/21/2023 1116 by Vinod Potter RN  Outcome: Progressing     Problem: ABCDS Injury Assessment  Goal: Absence of physical injury  7/22/2023 0105 by Christopher Vidal RN  Outcome: Progressing  7/21/2023 1116 by Vinod Potter RN  Outcome: Progressing     Problem: Pain  Goal: Verbalizes/displays adequate comfort level or baseline comfort level  7/22/2023 0105 by Christopher Vidal RN  Outcome: Progressing  7/21/2023 1116 by Vinod Potter RN  Outcome: Progressing     Problem: Safety - Adult  Goal: Free from fall injury  7/22/2023 0105 by Christopher Vidal RN  Outcome: Progressing  7/21/2023 1116 by Vinod Potter RN  Outcome: Progressing     Problem: Hematologic - Adult  Goal: Maintains hematologic stability  Outcome: Progressing

## 2023-07-22 NOTE — PLAN OF CARE
Problem: Discharge Planning  Goal: Discharge to home or other facility with appropriate resources  7/22/2023 1240 by Magdalena Jimenes RN  Outcome: Completed  7/22/2023 0105 by Jason Cheng RN  Outcome: Progressing     Problem: ABCDS Injury Assessment  Goal: Absence of physical injury  7/22/2023 1240 by Magdalena Jimenes RN  Outcome: Completed  7/22/2023 0105 by Jason Cheng RN  Outcome: Progressing     Problem: Pain  Goal: Verbalizes/displays adequate comfort level or baseline comfort level  7/22/2023 1240 by Magdalena Jimenes RN  Outcome: Completed  7/22/2023 0105 by Jason Cheng RN  Outcome: Progressing     Problem: Safety - Adult  Goal: Free from fall injury  7/22/2023 1240 by Magdalena Jimenes RN  Outcome: Completed  7/22/2023 0105 by Jason Cheng RN  Outcome: Progressing     Problem: Hematologic - Adult  Goal: Maintains hematologic stability  7/22/2023 1240 by Magdalena Jimenes RN  Outcome: Completed  7/22/2023 0105 by Jason Cheng RN  Outcome: Progressing

## 2023-07-22 NOTE — PROGRESS NOTES
Pts feeling much better. No n/v.   Reg diet. Ambulating. S/p 2 units prbcs. Voiding. Passing gas.   Af, vss  Abd- incision intact and no erythema, min tender  Ext- nt, no edema  H/h- 9.3/29.8  Assess:  POD 2 s/p tahbso, acute anemia  Plan:  d/c home, d/c instructions, rx percocet, f/u office one week

## 2023-07-22 NOTE — PROGRESS NOTES
Pt complaining of 3-4 out of 10 abd discomfort. Refused pain medicine at this time. She states she feels like she has to have a bowel movement. This RN ambulated pt in hallway and administered glycolax.

## 2023-07-24 ENCOUNTER — TELEPHONE (OUTPATIENT)
Dept: FAMILY MEDICINE CLINIC | Age: 58
End: 2023-07-24

## 2023-07-24 LAB — BLOOD BANK REF CASE: NORMAL

## 2023-07-24 NOTE — TELEPHONE ENCOUNTER
Care Transitions Initial Follow Up Call    Outreach made within 2 business days of discharge: Yes    Patient: Boubacar Oneill Patient : 1965   MRN: [id number]  Reason for Admission: There are no discharge diagnoses documented for the most recent discharge. Discharge Date: 23       Spoke with: DENICE    Discharge department/facility: [facility]    TCM Interactive Patient Contact:  Was patient able to fill all prescriptions: NA  Was patient instructed to bring all medications to the follow-up visit: NA  Is patient taking all medications as directed in the discharge summary? NA  Does patient understand their discharge instructions: NA  does patient have questions or concerns that need addressed prior to 7-14 day follow up office visit: NA    Scheduled appointment with PCP within 7-14 days    Follow Up  Future Appointments   Date Time Provider [facility] [address]   2023  9:40 AM Shawn Castro MD [facility]     PT [address] THOM[place] FOR WED .   NO NEED FOR FOLLOW UP IN OUR OFFICE AT THIS TIME. [address] [facility]    Brooke Rizzo MA

## 2023-07-25 NOTE — PROGRESS NOTES
Physician Progress Note      PATIENT:               Juan Kiran  CSN #:                  [id number]  :                       1965  ADMIT DATE:       2023 6:01 AM  DISCH DATE:        2023 3:39 PM  RESPONDING  PROVIDER #:        Shayla Scott MD          QUERY TEXT:    Pt admitted with enlarged uterus, bilateral tubo-ovarian masses and had total   abdominal hysterectomy, bilateral salpingo-oophorectomy. Post-op H/H down to   6.7/22.4 and acute anemia documented in progress notes. If possible, please   document in progress notes and discharge summary further specificity regarding   the acuity and type of anemia:    The medical record reflects the following:  Risk Factors: CALEB, BSO  Clinical Indicators: post-op H/H down to 6.7/22.4  Treatment: 2 units PRBCs, monitoring CBC  Options provided:  -- Postoperative acute blood loss anemia  -- Other - I will add my own diagnosis  -- Disagree - Not applicable / Not valid  -- Disagree - Clinically unable to determine / Unknown  -- Refer to Clinical Documentation Reviewer    PROVIDER RESPONSE TEXT:    This patient has postoperative acute blood loss anemia.     Query created by: Racheal Jenesn on 2023 7:06 AM      Electronically signed by:  Shayla Scott MD  5:17 AM

## 2023-07-26 ENCOUNTER — OFFICE VISIT (OUTPATIENT)
Dept: GYNECOLOGY | Age: 58
End: 2023-07-26

## 2023-07-26 VITALS — DIASTOLIC BLOOD PRESSURE: 92 MMHG | WEIGHT: 156.6 LBS | BODY MASS INDEX: 28.64 KG/M2 | SYSTOLIC BLOOD PRESSURE: 150 MMHG

## 2023-07-26 DIAGNOSIS — Z98.890 POST-OPERATIVE STATE: Primary | ICD-10-CM

## 2023-07-26 PROCEDURE — 99024 POSTOP FOLLOW-UP VISIT: CPT | Performed by: OBSTETRICS & GYNECOLOGY

## 2023-07-26 NOTE — PROGRESS NOTES
Pts doing well after surgery. She denies pain, hasn't taken pain meds.   Af, vss  Abd- incision intact and no erythema, min tender, staples out steristrips on   Assess:  normal post op state  Plan:  f/u one month

## 2023-08-29 ENCOUNTER — OFFICE VISIT (OUTPATIENT)
Dept: GYNECOLOGY | Age: 58
End: 2023-08-29

## 2023-08-29 VITALS — DIASTOLIC BLOOD PRESSURE: 80 MMHG | SYSTOLIC BLOOD PRESSURE: 132 MMHG | BODY MASS INDEX: 29.59 KG/M2 | WEIGHT: 161.8 LBS

## 2023-08-29 DIAGNOSIS — Z98.890 POST-OPERATIVE STATE: Primary | ICD-10-CM

## 2023-08-29 PROCEDURE — 99024 POSTOP FOLLOW-UP VISIT: CPT | Performed by: OBSTETRICS & GYNECOLOGY

## 2023-08-29 NOTE — PROGRESS NOTES
Pts doing well after surgery. Denies pain.   Af, vss  Abd- incision intact and no erythema  Vag- cuff healed, no d/c, good support  Assess: normal post op state  Plan:  f/u annual gyn exam.

## 2023-08-31 RX ORDER — AMLODIPINE BESYLATE 10 MG/1
10 TABLET ORAL DAILY
Qty: 30 TABLET | Refills: 8 | OUTPATIENT
Start: 2023-08-31

## 2023-09-07 RX ORDER — AMLODIPINE BESYLATE 10 MG/1
10 TABLET ORAL DAILY
Qty: 30 TABLET | Refills: 8 | Status: CANCELLED | OUTPATIENT
Start: 2023-09-07

## 2023-09-08 RX ORDER — AMLODIPINE BESYLATE 10 MG/1
10 TABLET ORAL DAILY
Qty: 30 TABLET | Refills: 0 | Status: SHIPPED | OUTPATIENT
Start: 2023-09-08

## 2023-09-18 ENCOUNTER — TELEPHONE (OUTPATIENT)
Dept: FAMILY MEDICINE CLINIC | Age: 58
End: 2023-09-18

## 2023-09-18 RX ORDER — BISOPROLOL FUMARATE AND HYDROCHLOROTHIAZIDE 5; 6.25 MG/1; MG/1
2 TABLET ORAL DAILY
Qty: 60 TABLET | Refills: 2 | Status: SHIPPED | OUTPATIENT
Start: 2023-09-18

## 2023-09-18 NOTE — TELEPHONE ENCOUNTER
----- Message from MARCEL sent at 9/18/2023  2:54 PM EDT -----  Subject: Message to Provider    QUESTIONS  Information for Provider? Xena Kunz at [employer], [address] in [place], [id number] requesting a call back. She states they are not   showing that Dr. Lele Lynn is licensed in [place]. They will need verification   that he is or send a script from a PCP that is licensed in [place].  ---------------------------------------------------------------------------  --------------  [address] [place] [address] Abhay  [phone]; [place] to leave message on voicemail  ---------------------------------------------------------------------------  --------------  SCRIPT ANSWERS  Relationship to Patient? Covered Entity  Covered Entity Type? Pharmacy? Representative Name?  Xena Kunz at [facility] in [place]

## 2023-10-02 RX ORDER — AMLODIPINE BESYLATE 10 MG/1
10 TABLET ORAL DAILY
Qty: 30 TABLET | Refills: 0 | Status: SHIPPED | OUTPATIENT
Start: 2023-10-02

## 2023-10-18 ENCOUNTER — OFFICE VISIT (OUTPATIENT)
Dept: FAMILY MEDICINE CLINIC | Age: 58
End: 2023-10-18
Payer: COMMERCIAL

## 2023-10-18 VITALS
BODY MASS INDEX: 30.36 KG/M2 | HEART RATE: 52 BPM | SYSTOLIC BLOOD PRESSURE: 152 MMHG | DIASTOLIC BLOOD PRESSURE: 92 MMHG | WEIGHT: 165 LBS | HEIGHT: 62 IN

## 2023-10-18 DIAGNOSIS — I10 PRIMARY HYPERTENSION: Primary | ICD-10-CM

## 2023-10-18 LAB
ANION GAP SERPL CALCULATED.3IONS-SCNC: 11 MMOL/L (ref 3–16)
BUN SERPL-MCNC: 13 MG/DL (ref 7–20)
CALCIUM SERPL-MCNC: 9.7 MG/DL (ref 8.3–10.6)
CHLORIDE SERPL-SCNC: 101 MMOL/L (ref 99–110)
CHOLEST SERPL-MCNC: 211 MG/DL (ref 0–199)
CO2 SERPL-SCNC: 27 MMOL/L (ref 21–32)
CREAT SERPL-MCNC: 0.8 MG/DL (ref 0.6–1.1)
GFR SERPLBLD CREATININE-BSD FMLA CKD-EPI: >60 ML/MIN/{1.73_M2}
GLUCOSE SERPL-MCNC: 119 MG/DL (ref 70–99)
HDLC SERPL-MCNC: 47 MG/DL (ref 40–60)
LDLC SERPL CALC-MCNC: 146 MG/DL
POTASSIUM SERPL-SCNC: 4.8 MMOL/L (ref 3.5–5.1)
SODIUM SERPL-SCNC: 139 MMOL/L (ref 136–145)
TRIGL SERPL-MCNC: 91 MG/DL (ref 0–150)
VLDLC SERPL CALC-MCNC: 18 MG/DL

## 2023-10-18 PROCEDURE — 3077F SYST BP >= 140 MM HG: CPT | Performed by: NURSE PRACTITIONER

## 2023-10-18 PROCEDURE — 3080F DIAST BP >= 90 MM HG: CPT | Performed by: NURSE PRACTITIONER

## 2023-10-18 PROCEDURE — 99214 OFFICE O/P EST MOD 30 MIN: CPT | Performed by: NURSE PRACTITIONER

## 2023-10-18 PROCEDURE — 36415 COLL VENOUS BLD VENIPUNCTURE: CPT | Performed by: NURSE PRACTITIONER

## 2023-10-18 RX ORDER — AMLODIPINE BESYLATE 10 MG/1
10 TABLET ORAL DAILY
Qty: 30 TABLET | Refills: 0 | Status: SHIPPED | OUTPATIENT
Start: 2023-10-18

## 2023-10-18 RX ORDER — BISOPROLOL FUMARATE AND HYDROCHLOROTHIAZIDE 5; 6.25 MG/1; MG/1
2 TABLET ORAL DAILY
Qty: 60 TABLET | Refills: 2 | Status: SHIPPED | OUTPATIENT
Start: 2023-10-18

## 2023-10-18 SDOH — ECONOMIC STABILITY: HOUSING INSECURITY
IN THE LAST 12 MONTHS, WAS THERE A TIME WHEN YOU DID NOT HAVE A STEADY PLACE TO SLEEP OR SLEPT IN A SHELTER (INCLUDING NOW)?: NO

## 2023-10-18 SDOH — ECONOMIC STABILITY: FOOD INSECURITY: WITHIN THE PAST 12 MONTHS, THE FOOD YOU BOUGHT JUST DIDN'T LAST AND YOU DIDN'T HAVE MONEY TO GET MORE.: NEVER TRUE

## 2023-10-18 SDOH — ECONOMIC STABILITY: INCOME INSECURITY: HOW HARD IS IT FOR YOU TO PAY FOR THE VERY BASICS LIKE FOOD, HOUSING, MEDICAL CARE, AND HEATING?: NOT HARD AT ALL

## 2023-10-18 SDOH — ECONOMIC STABILITY: FOOD INSECURITY: WITHIN THE PAST 12 MONTHS, YOU WORRIED THAT YOUR FOOD WOULD RUN OUT BEFORE YOU GOT MONEY TO BUY MORE.: NEVER TRUE

## 2023-10-18 ASSESSMENT — PATIENT HEALTH QUESTIONNAIRE - PHQ9
SUM OF ALL RESPONSES TO PHQ QUESTIONS 1-9: 0
SUM OF ALL RESPONSES TO PHQ9 QUESTIONS 1 & 2: 0
1. LITTLE INTEREST OR PLEASURE IN DOING THINGS: 0
2. FEELING DOWN, DEPRESSED OR HOPELESS: 0
SUM OF ALL RESPONSES TO PHQ QUESTIONS 1-9: 0

## 2023-10-18 NOTE — PROGRESS NOTES
balanced diet and staying active by exercising within their personal limits. Orders as listed above. Patient was advised to keep future appointments with their respective specialty care team(s). Patient had the opportunity to ask questions, all of which were answered to the best of my ability and with patient satisfaction. Patient understands and is agreeable with the care plan following today's visit. Patient is to schedule an appointment for any new or worsening symptoms. Go to ER for significant shortness of breath, chest pain, or uncontrolled pain or fever. I discussed with patient the risk and benefits of any medications that were prescribed today. I verified that the patient understands their medications, labs, and/or procedures. The patient is doing well with current medication regimen and does not have any barriers to adherence. The patient's self-management abilities are good. An electronic signature was used to authenticate this note.     --KEYLA Fabian - CNP

## 2023-11-10 DIAGNOSIS — I10 PRIMARY HYPERTENSION: ICD-10-CM

## 2023-11-10 RX ORDER — AMLODIPINE BESYLATE 10 MG/1
10 TABLET ORAL DAILY
Qty: 30 TABLET | Refills: 5 | Status: SHIPPED | OUTPATIENT
Start: 2023-11-10

## 2024-02-05 RX ORDER — BISOPROLOL FUMARATE AND HYDROCHLOROTHIAZIDE 5; 6.25 MG/1; MG/1
2 TABLET ORAL DAILY
COMMUNITY
End: 2024-02-05 | Stop reason: SDUPTHER

## 2024-02-06 RX ORDER — BISOPROLOL FUMARATE AND HYDROCHLOROTHIAZIDE 5; 6.25 MG/1; MG/1
2 TABLET ORAL DAILY
Qty: 60 TABLET | Refills: 3 | Status: SHIPPED | OUTPATIENT
Start: 2024-02-06

## 2024-02-14 DIAGNOSIS — I10 PRIMARY HYPERTENSION: ICD-10-CM

## 2024-02-14 NOTE — TELEPHONE ENCOUNTER
Patient is calling because she needs a refill on her medication AMLODIPINE 10 MG TABLET - 1 TABLET BY MOUTH DAILY - 90 DAY SUPPLY.      [facility]- [address] - PHONE NO. [phone].    PLEASE GIVE HER A CALL BACK.

## 2024-02-15 RX ORDER — AMLODIPINE BESYLATE 10 MG/1
10 TABLET ORAL DAILY
Qty: 90 TABLET | Refills: 0 | Status: SHIPPED | OUTPATIENT
Start: 2024-02-15

## 2024-04-16 ASSESSMENT — PATIENT HEALTH QUESTIONNAIRE - PHQ9
1. LITTLE INTEREST OR PLEASURE IN DOING THINGS: NOT AT ALL
1. LITTLE INTEREST OR PLEASURE IN DOING THINGS: NOT AT ALL
SUM OF ALL RESPONSES TO PHQ QUESTIONS 1-9: 0
SUM OF ALL RESPONSES TO PHQ QUESTIONS 1-9: 0
2. FEELING DOWN, DEPRESSED OR HOPELESS: NOT AT ALL
SUM OF ALL RESPONSES TO PHQ QUESTIONS 1-9: 0
SUM OF ALL RESPONSES TO PHQ9 QUESTIONS 1 & 2: 0
2. FEELING DOWN, DEPRESSED OR HOPELESS: NOT AT ALL
SUM OF ALL RESPONSES TO PHQ9 QUESTIONS 1 & 2: 0
SUM OF ALL RESPONSES TO PHQ QUESTIONS 1-9: 0

## 2024-04-17 NOTE — PATIENT INSTRUCTIONS
your doctor about other health professionals who can help you achieve your weight loss goals.  A dietitian can help you make healthy changes in your diet.  An exercise specialist or  can help you develop a safe and effective exercise program.  A counselor or psychiatrist can help you cope with issues such as depression, anxiety, or family problems that can make it hard to focus on weight loss.  Consider joining a support group for people who are trying to lose weight. Your doctor can suggest groups in your area.  Where can you learn more?  Go to https://www.[employer].net/patientEd and enter U357 to learn more about \"Starting a Weight Loss Plan: Care Instructions.\"  Current as of: September 20, 2023               Content Version: 14.0  © 2006-2024 [employer].   Care instructions adapted under license by [employer]. If you have questions about a medical condition or this instruction, always ask your healthcare professional. [employer] disclaims any warranty or liability for your use of this information.

## 2024-04-18 ENCOUNTER — OFFICE VISIT (OUTPATIENT)
Dept: FAMILY MEDICINE CLINIC | Age: 59
End: 2024-04-18
Payer: COMMERCIAL

## 2024-04-18 VITALS
OXYGEN SATURATION: 99 % | WEIGHT: 173 LBS | HEIGHT: 62 IN | SYSTOLIC BLOOD PRESSURE: 136 MMHG | HEART RATE: 52 BPM | DIASTOLIC BLOOD PRESSURE: 84 MMHG | BODY MASS INDEX: 31.83 KG/M2

## 2024-04-18 DIAGNOSIS — R73.9 HYPERGLYCEMIA: ICD-10-CM

## 2024-04-18 DIAGNOSIS — Z23 NEED FOR SHINGLES VACCINE: ICD-10-CM

## 2024-04-18 DIAGNOSIS — R63.5 WEIGHT GAIN: ICD-10-CM

## 2024-04-18 DIAGNOSIS — Z12.11 COLON CANCER SCREENING: ICD-10-CM

## 2024-04-18 DIAGNOSIS — Z00.00 ENCOUNTER FOR WELL ADULT EXAM WITHOUT ABNORMAL FINDINGS: ICD-10-CM

## 2024-04-18 DIAGNOSIS — Z86.2 HISTORY OF ANEMIA: ICD-10-CM

## 2024-04-18 DIAGNOSIS — N95.1 MENOPAUSAL SYMPTOM: ICD-10-CM

## 2024-04-18 DIAGNOSIS — E78.00 HYPERCHOLESTEREMIA: ICD-10-CM

## 2024-04-18 DIAGNOSIS — L81.7 PIGMENTED PURPURIC DERMATOSIS: ICD-10-CM

## 2024-04-18 DIAGNOSIS — Z71.89 ACP (ADVANCE CARE PLANNING): ICD-10-CM

## 2024-04-18 DIAGNOSIS — I10 PRIMARY HYPERTENSION: ICD-10-CM

## 2024-04-18 DIAGNOSIS — Z23 NEED FOR DIPHTHERIA-TETANUS-PERTUSSIS (TDAP) VACCINE: ICD-10-CM

## 2024-04-18 DIAGNOSIS — Z90.710 S/P TAH-BSO (TOTAL ABDOMINAL HYSTERECTOMY AND BILATERAL SALPINGO-OOPHORECTOMY): ICD-10-CM

## 2024-04-18 DIAGNOSIS — Z12.2 ENCOUNTER FOR SCREENING FOR LUNG CANCER: ICD-10-CM

## 2024-04-18 DIAGNOSIS — Z90.722 S/P TAH-BSO (TOTAL ABDOMINAL HYSTERECTOMY AND BILATERAL SALPINGO-OOPHORECTOMY): ICD-10-CM

## 2024-04-18 DIAGNOSIS — Z11.59 NEED FOR HEPATITIS C SCREENING TEST: ICD-10-CM

## 2024-04-18 DIAGNOSIS — Z90.79 S/P TAH-BSO (TOTAL ABDOMINAL HYSTERECTOMY AND BILATERAL SALPINGO-OOPHORECTOMY): ICD-10-CM

## 2024-04-18 DIAGNOSIS — Z00.00 WELL ADULT EXAM: Primary | ICD-10-CM

## 2024-04-18 LAB
BASOPHILS # BLD: 0.1 K/UL (ref 0–0.2)
BASOPHILS NFR BLD: 0.5 %
DEPRECATED RDW RBC AUTO: 14.8 % (ref 12.4–15.4)
EOSINOPHIL # BLD: 0.2 K/UL (ref 0–0.6)
EOSINOPHIL NFR BLD: 2.1 %
HCT VFR BLD AUTO: 43.9 % (ref 36–48)
HGB BLD-MCNC: 14.4 G/DL (ref 12–16)
LYMPHOCYTES # BLD: 1.7 K/UL (ref 1–5.1)
LYMPHOCYTES NFR BLD: 16.4 %
MCH RBC QN AUTO: 27.7 PG (ref 26–34)
MCHC RBC AUTO-ENTMCNC: 32.8 G/DL (ref 31–36)
MCV RBC AUTO: 84.4 FL (ref 80–100)
MONOCYTES # BLD: 0.6 K/UL (ref 0–1.3)
MONOCYTES NFR BLD: 5.6 %
NEUTROPHILS # BLD: 7.7 K/UL (ref 1.7–7.7)
NEUTROPHILS NFR BLD: 75.4 %
PLATELET # BLD AUTO: 309 K/UL (ref 135–450)
PMV BLD AUTO: 8.3 FL (ref 5–10.5)
RBC # BLD AUTO: 5.2 M/UL (ref 4–5.2)
WBC # BLD AUTO: 10.2 K/UL (ref 4–11)

## 2024-04-18 PROCEDURE — 99396 PREV VISIT EST AGE 40-64: CPT | Performed by: FAMILY MEDICINE

## 2024-04-18 PROCEDURE — 90750 HZV VACC RECOMBINANT IM: CPT | Performed by: FAMILY MEDICINE

## 2024-04-18 PROCEDURE — 90715 TDAP VACCINE 7 YRS/> IM: CPT | Performed by: FAMILY MEDICINE

## 2024-04-18 PROCEDURE — 90471 IMMUNIZATION ADMIN: CPT | Performed by: FAMILY MEDICINE

## 2024-04-18 PROCEDURE — 3079F DIAST BP 80-89 MM HG: CPT | Performed by: FAMILY MEDICINE

## 2024-04-18 PROCEDURE — 3075F SYST BP GE 130 - 139MM HG: CPT | Performed by: FAMILY MEDICINE

## 2024-04-18 PROCEDURE — 90472 IMMUNIZATION ADMIN EACH ADD: CPT | Performed by: FAMILY MEDICINE

## 2024-04-18 RX ORDER — AMLODIPINE BESYLATE 10 MG/1
10 TABLET ORAL DAILY
Qty: 90 TABLET | Refills: 3 | Status: SHIPPED | OUTPATIENT
Start: 2024-04-18

## 2024-04-18 RX ORDER — BISOPROLOL FUMARATE AND HYDROCHLOROTHIAZIDE 5; 6.25 MG/1; MG/1
2 TABLET ORAL DAILY
Qty: 180 TABLET | Refills: 3 | Status: SHIPPED | OUTPATIENT
Start: 2024-04-18

## 2024-04-18 NOTE — PROGRESS NOTES
Immunizations Administered       Name Date Dose Route    TDaP, ADACEL (age 10y-64y), BOOSTRIX (age 10y+), IM, 0.5mL 4/18/2024 0.5 mL Intramuscular    Site: Deltoid- Right    Lot: [id number]    NDC: 66722-516-29    Zoster Recombinant (Shingrix) 4/18/2024 0.5 mL Intramuscular    Site: Deltoid- Left    Lot: [id number]    NDC: 65733-478-14            
Units 10/18/2023    11:30 AM 7/22/2023     5:40 AM 7/21/2023     5:51 AM   LAB PRIMARY CARE   GLU random 70 - 99 mg/dL 119      CHOL 0 - 199 mg/dL 211      TRIG 0 - 150 mg/dL 91      HDL 40 - 60 mg/dL 47      LDL CALC <100 mg/dL 146       - 145 mmol/L 139      K 3.5 - 5.1 mmol/L 4.8      BUN 7 - 20 mg/dL 13      CR 0.6 - 1.1 mg/dL 0.8      CA 8.3 - 10.6 mg/dL 9.7      HGB 12.0 - 16.0 g/dL  9.3  6.7        Lab Results   Component Value Date/Time    CHOL 211 10/18/2023 11:30 AM    CHOL 276 06/02/2022 09:21 AM    CHOL 243 01/05/2016 09:52 AM    TRIG 91 10/18/2023 11:30 AM    TRIG 118 06/02/2022 09:21 AM    TRIG 78 01/05/2016 09:52 AM    HDL 47 10/18/2023 11:30 AM    HDL 63 06/02/2022 09:21 AM    HDL 51 01/05/2016 09:52 AM    LDLCALC 146 10/18/2023 11:30 AM    LDLCALC 189 06/02/2022 09:21 AM    LDLCALC 176 01/05/2016 09:52 AM    GLUCOSE 119 10/18/2023 11:30 AM       The 10-year ASCVD risk score (Vineet MEJIA, et al., 2019) is: 4%    Values used to calculate the score:      Age: 58 years      Sex: Female      Is Non- : No      Diabetic: No      Tobacco smoker: No      Systolic Blood Pressure: 136 mmHg      Is BP treated: Yes      HDL Cholesterol: 47 mg/dL      Total Cholesterol: 211 mg/dL    Immunization History   Administered Date(s) Administered    COVID-19, MODERNA PURPLE border, (age 18y+ booster, 6y-11y series), IM, 50 mcg/0.5 mL 04/01/2021, 05/06/2021    Influenza Virus Vaccine 10/20/2012    Influenza, Intradermal, Preservative free 01/05/2016       Health Maintenance   Topic Date Due    Hepatitis B vaccine (1 of 3 - 3-dose series) Never done    HIV screen  Never done    Hepatitis C screen  Never done    DTaP/Tdap/Td vaccine (1 - Tdap) Never done    Diabetes screen  Never done    Colorectal Cancer Screen  Never done    Shingles vaccine (1 of 2) Never done    COVID-19 Vaccine (3 - 2023-24 season) 09/01/2023    Breast cancer screen  07/21/2024    Flu vaccine (Season Ended) 08/01/2024

## 2024-04-19 LAB
EST. AVERAGE GLUCOSE BLD GHB EST-MCNC: 111.2 MG/DL
FERRITIN SERPL IA-MCNC: 47.3 NG/ML (ref 15–150)
HBA1C MFR BLD: 5.5 %
TSH SERPL DL<=0.005 MIU/L-ACNC: 3.51 UIU/ML (ref 0.27–4.2)

## 2024-05-02 ENCOUNTER — HOSPITAL ENCOUNTER (OUTPATIENT)
Dept: CT IMAGING | Age: 59
Discharge: HOME | End: 2024-05-02
Attending: FAMILY MEDICINE
Payer: COMMERCIAL

## 2024-05-02 DIAGNOSIS — Z12.2 ENCOUNTER FOR SCREENING FOR LUNG CANCER: ICD-10-CM

## 2024-05-02 PROCEDURE — 71271 CT THORAX LUNG CANCER SCR C-: CPT

## 2024-05-07 LAB — NONINV COLON CA DNA+OCC BLD SCRN STL QL: NEGATIVE

## 2024-05-22 ENCOUNTER — HOSPITAL ENCOUNTER (OUTPATIENT)
Dept: WOMENS IMAGING | Age: 59
Discharge: HOME | End: 2024-05-22
Payer: COMMERCIAL

## 2024-05-22 VITALS — HEIGHT: 63 IN | BODY MASS INDEX: 30.12 KG/M2 | WEIGHT: 170 LBS

## 2024-05-22 DIAGNOSIS — N63.20 MASS OF LEFT BREAST, UNSPECIFIED QUADRANT: ICD-10-CM

## 2024-05-22 PROCEDURE — 77063 BREAST TOMOSYNTHESIS BI: CPT

## 2024-05-23 ENCOUNTER — TELEPHONE (OUTPATIENT)
Dept: WOMENS IMAGING | Age: 59
End: 2024-05-23

## 2024-05-23 ENCOUNTER — TELEPHONE (OUTPATIENT)
Dept: FAMILY MEDICINE CLINIC | Age: 59
End: 2024-05-23

## 2024-05-23 NOTE — TELEPHONE ENCOUNTER
CALLED AND SPOKE TO PATIENT AND ADVISED ON HER MAMMOGRAM RESULTS. SHE STATES THE SCHEDULING PEOPLE ALREADY CALLED HER AND  SHE IS SCHEDULED FOR 6/3/24 TO HAVE THIS DONE. PATIENT DID WANT ME TO PUT A MESSAGE BACK TO DR. GALLAGHER TO HAVE HIM LOOK AT HER LAST MAMMOGRAM FROM 2022. SHE STATES THE SAME THING HAPPENED TO HER BACK THEN TO AND FROM WHAT MY UNDERSTANDING FROM THE PATIENT IS THAT SHE WENT THROUGH ALL THIS AND IT ENDED UP NOT BEING AS BAD AS SHE THOUGHT. FROM WHAT SHE CAN TELL WAS THE MEASUREMENTS FROM 2022 AND THE MOST RECENT ONE LOOK LIKE THEY ARE EXACTLY THE SAME. SHE WANTS TO KNOW IF DR. GALLAGHER CAN COMPARE THESE AND LET HER KNOW IF SHE SHOULD STILL GO THROUGH WITH THE OTHER TESTING. IF DR. GALLAGHER DECIDES TO HAVE HER DO THIS SHE WILL, JUST WANTS HIS OPINION. PATIENT AWARE IT WILL BE TOMORROW BEFORE SHE HEARS BACK FROM US. SC

## 2024-05-24 NOTE — TELEPHONE ENCOUNTER
On comparison  of the mammogram reports, there are new micro calcifications seen in the right breast that were not on the previous mammogram. I recommend going ahead w/ follow up imaging.

## 2024-06-03 ENCOUNTER — TELEPHONE (OUTPATIENT)
Dept: WOMENS IMAGING | Age: 59
End: 2024-06-03

## 2024-06-03 ENCOUNTER — PRE-PROCEDURE TELEPHONE (OUTPATIENT)
Dept: WOMENS IMAGING | Age: 59
End: 2024-06-03

## 2024-06-03 ENCOUNTER — HOSPITAL ENCOUNTER (OUTPATIENT)
Dept: WOMENS IMAGING | Age: 59
Discharge: HOME | End: 2024-06-03
Payer: COMMERCIAL

## 2024-06-03 DIAGNOSIS — R92.8 ABNORMAL MAMMOGRAM OF RIGHT BREAST: ICD-10-CM

## 2024-06-03 DIAGNOSIS — R92.8 ABNORMAL MAMMOGRAM: Primary | ICD-10-CM

## 2024-06-03 PROCEDURE — G0279 TOMOSYNTHESIS, MAMMO: HCPCS

## 2024-06-03 NOTE — TELEPHONE ENCOUNTER
Based on imaging done today please sign the following order. She is schedule 6/24.  Thank you,  Elijah Morales

## 2024-07-01 ENCOUNTER — TELEPHONE (OUTPATIENT)
Dept: WOMENS IMAGING | Age: 59
End: 2024-07-01

## 2024-07-24 ENCOUNTER — TELEPHONE (OUTPATIENT)
Dept: WOMENS IMAGING | Age: 59
End: 2024-07-24

## 2024-08-01 ENCOUNTER — TELEPHONE (OUTPATIENT)
Dept: WOMENS IMAGING | Age: 59
End: 2024-08-01

## 2024-08-01 NOTE — TELEPHONE ENCOUNTER
Patient originally scheduled for biopsy June 24th.  Pt canceled and said she will call back.  Navigators have LM's to reschedule on 7/1, 7/24, 7/26 and 8/1.  Patient did not return any messages.  MD was notified.  This is our last attempt at reaching patient.

## 2024-10-18 ENCOUNTER — NURSE ONLY (OUTPATIENT)
Dept: FAMILY MEDICINE CLINIC | Age: 59
End: 2024-10-18

## 2024-10-18 DIAGNOSIS — Z23 NEED FOR SHINGLES VACCINE: Primary | ICD-10-CM

## 2024-10-18 PROCEDURE — 90471 IMMUNIZATION ADMIN: CPT | Performed by: NURSE PRACTITIONER

## 2024-10-18 PROCEDURE — 90750 HZV VACC RECOMBINANT IM: CPT | Performed by: NURSE PRACTITIONER

## 2024-10-18 NOTE — PROGRESS NOTES
Immunizations Administered       Name Date Dose Route    Zoster Recombinant (Shingrix) 10/18/2024 0.5 mL Intramuscular    Site: Deltoid- Left    Lot: [id number]    NDC: 60977-821-63

## 2025-06-13 DIAGNOSIS — I10 PRIMARY HYPERTENSION: ICD-10-CM

## 2025-06-17 RX ORDER — BISOPROLOL FUMARATE AND HYDROCHLOROTHIAZIDE 5; 6.25 MG/1; MG/1
2 TABLET ORAL DAILY
Qty: 180 TABLET | Refills: 3 | OUTPATIENT
Start: 2025-06-17

## 2025-06-17 RX ORDER — AMLODIPINE BESYLATE 10 MG/1
10 TABLET ORAL DAILY
Qty: 90 TABLET | Refills: 3 | OUTPATIENT
Start: 2025-06-17

## 2025-06-18 NOTE — TELEPHONE ENCOUNTER
CALLED AND LEFT DETAILED MESSAGE FOR PATIENT LETTING HER KNOW THAT LOOKING BACK IN HER CHART IT WAS APRIL OF 2024 WHEN DR. GALLAGHER SAW HER LAST SO SHE IS DUE FOR A FOLLOW UP. ASKING HER TO CALL BACK TO PLEASE SCHEDULE WITH US. SC

## 2025-06-18 NOTE — TELEPHONE ENCOUNTER
Patient was calling to say that she is not due to come in. It has not been a year. [facility] only makes her come in 1x a year because she does not have insurance. She does not know Dr Sanchez and her Doctor is not hear and she feels like we are holding her medications Hostage.     Can we please give her a call

## 2025-06-19 NOTE — TELEPHONE ENCOUNTER
Patient called back stating she refuses to see anyone but Marita. She states we can hold her medication from her hostage and take her off of it because she will absolutely not see anyone but Marita. She stated that [facility] would not make her come in for a physical for medication.     After speaking with the patient and trying to tell her she had not been seen since 4/18/24 with Marita she needs to be seen by another provider in order for them to send in a script, providers need to see the patient before prescribing meds since the other providers have not seen her.     Patient then got very loud and verbally aggressive toward me. I told her she can call back when she calms down. I told her I understood her frustration but there was no reason for her to speak to me that way and to have a great day.    Sending as DAVID.

## 2025-06-19 NOTE — TELEPHONE ENCOUNTER
TRIED TO CALL PT TO DISCUSS, NO ANSWER, I SENT HER A ITS ComplianceT MESSAGE IF SHE CALLS BACK PLEASE SEND MESSAGE DIRECTLY TO ME.KW

## 2025-06-20 NOTE — TELEPHONE ENCOUNTER
Canceling scheduling ticket sent on 6/13 as pt refuses to be seen by anyone else + waiting on pt to call back to schedule.

## 2025-06-24 ENCOUNTER — TELEPHONE (OUTPATIENT)
Dept: FAMILY MEDICINE CLINIC | Age: 60
End: 2025-06-24

## 2025-06-24 NOTE — TELEPHONE ENCOUNTER
CALLED PT TO SCHEDULE A APPOINTMENT IN ORDER FOR HER TO RECEIVE HER REFILL SHE WAS YELLING AND SAYING SHE DOES NOT NEED TO COME IN FOR A APPOINTMENT TO HAVE A BLOOD PRESSURE MEDICATION REFILLED I EXPLAINED TO HER THAT SHE HAS TO BE SEEN IN ORDER FOR HER MEDICATION TO BE REFILLED THAT SHE HAS NOT BEEN SEEN IN OVER A YEAR. SHE CONTINUED TO YELL AT ME SAYING SHE WONT BE SEEING ANY OTHER PROVIDER HERE AND NO ONE NEEDS TO CALL HER UNTIL  COMES BACK. I DID EXPLAIN TO HER THAT SHE SHOULD HAVE HER BLOOD PRESSURE CHECKED TO MAKE SURE THAT HER MEDICATION IS STILL WORKING FOR HER. SHE WAS RUDE THE WHOLE CONVERSATION SHE TOLD ME THAT WE EXPECT HER TO DRIVE ALL THE WAY HERE WITH NO INSURANCE AND SHE IS NOT DOING IT.

## 2025-07-07 ENCOUNTER — TELEPHONE (OUTPATIENT)
Dept: FAMILY MEDICINE CLINIC | Age: 60
End: 2025-07-07

## 2025-07-07 NOTE — TELEPHONE ENCOUNTER
Patient is upset because no one told her about Dr. Kirkland being out and she needs her medication refill, and she has not seen any other CNP or doctor at are office. She would like to speak with [place].

## 2025-07-08 ENCOUNTER — TELEPHONE (OUTPATIENT)
Dept: FAMILY MEDICINE CLINIC | Age: 60
End: 2025-07-08

## 2025-07-08 DIAGNOSIS — I10 PRIMARY HYPERTENSION: ICD-10-CM

## 2025-07-08 RX ORDER — AMLODIPINE BESYLATE 10 MG/1
10 TABLET ORAL DAILY
Qty: 30 TABLET | Refills: 0 | Status: SHIPPED | OUTPATIENT
Start: 2025-07-08

## 2025-07-08 RX ORDER — BISOPROLOL FUMARATE AND HYDROCHLOROTHIAZIDE 5; 6.25 MG/1; MG/1
2 TABLET ORAL DAILY
Qty: 30 TABLET | Refills: 0 | Status: SHIPPED | OUTPATIENT
Start: 2025-07-08

## 2025-07-08 NOTE — TELEPHONE ENCOUNTER
Aggie Mccoy, APRN - CNP  [facility] Staff16 minutes ago (9:57 AM)       Done, thank you.     CALLED AND SPOKE TO PATIENT AND ADVISED THIS WAS DONE. SC

## 2025-07-08 NOTE — TELEPHONE ENCOUNTER
PT CALLED IN TO SCHEDULE A PHYSICAL, NERVOUS ABOUT SEEING ANOTHER PROVIDER WITH [facility] BEING OUT. SHE HAS BEEN OUT OF [facility] I HAVE HER SCHEDULED 7/23/2025 FOR HER PHYSICAL AND WILL DO LABS AT APPT. CAN WE SEND HER MEDICATION UNTIL APPT SHE SHE IS NOT OUT?kW

## 2025-08-12 ENCOUNTER — OFFICE VISIT (OUTPATIENT)
Dept: FAMILY MEDICINE CLINIC | Age: 60
End: 2025-08-12

## 2025-08-12 VITALS
WEIGHT: 167 LBS | SYSTOLIC BLOOD PRESSURE: 122 MMHG | OXYGEN SATURATION: 98 % | DIASTOLIC BLOOD PRESSURE: 80 MMHG | HEIGHT: 63 IN | HEART RATE: 59 BPM | BODY MASS INDEX: 29.59 KG/M2

## 2025-08-12 DIAGNOSIS — R91.8 LUNG NODULES: ICD-10-CM

## 2025-08-12 DIAGNOSIS — E78.2 MIXED HYPERLIPIDEMIA: ICD-10-CM

## 2025-08-12 DIAGNOSIS — Z00.00 ENCOUNTER FOR WELLNESS EXAMINATION IN ADULT: Primary | ICD-10-CM

## 2025-08-12 DIAGNOSIS — I10 PRIMARY HYPERTENSION: ICD-10-CM

## 2025-08-12 DIAGNOSIS — R92.8 ABNORMAL MAMMOGRAM OF RIGHT BREAST: ICD-10-CM

## 2025-08-12 DIAGNOSIS — J43.2 CENTRILOBULAR EMPHYSEMA (HCC): ICD-10-CM

## 2025-08-12 PROCEDURE — 3074F SYST BP LT 130 MM HG: CPT | Performed by: REGISTERED NURSE

## 2025-08-12 PROCEDURE — 99396 PREV VISIT EST AGE 40-64: CPT | Performed by: REGISTERED NURSE

## 2025-08-12 PROCEDURE — 3079F DIAST BP 80-89 MM HG: CPT | Performed by: REGISTERED NURSE

## 2025-08-12 RX ORDER — BISOPROLOL FUMARATE AND HYDROCHLOROTHIAZIDE 5; 6.25 MG/1; MG/1
2 TABLET ORAL DAILY
Qty: 30 TABLET | Refills: 0 | Status: SHIPPED | OUTPATIENT
Start: 2025-08-12 | End: 2025-08-12

## 2025-08-12 RX ORDER — AMLODIPINE BESYLATE 10 MG/1
10 TABLET ORAL DAILY
Qty: 90 TABLET | Refills: 1 | Status: SHIPPED | OUTPATIENT
Start: 2025-08-12 | End: 2026-02-08

## 2025-08-12 RX ORDER — AMLODIPINE BESYLATE 10 MG/1
10 TABLET ORAL DAILY
Qty: 30 TABLET | Refills: 0 | Status: SHIPPED | OUTPATIENT
Start: 2025-08-12 | End: 2025-08-12

## 2025-08-12 RX ORDER — BISOPROLOL FUMARATE AND HYDROCHLOROTHIAZIDE 5; 6.25 MG/1; MG/1
2 TABLET ORAL DAILY
Qty: 180 TABLET | Refills: 1 | Status: SHIPPED | OUTPATIENT
Start: 2025-08-12 | End: 2026-02-08

## 2025-08-12 RX ORDER — ALBUTEROL SULFATE 90 UG/1
2 INHALANT RESPIRATORY (INHALATION) 4 TIMES DAILY PRN
Qty: 18 G | Refills: 2 | Status: SHIPPED | OUTPATIENT
Start: 2025-08-12

## 2025-08-12 SDOH — ECONOMIC STABILITY: FOOD INSECURITY: WITHIN THE PAST 12 MONTHS, THE FOOD YOU BOUGHT JUST DIDN'T LAST AND YOU DIDN'T HAVE MONEY TO GET MORE.: NEVER TRUE

## 2025-08-12 SDOH — ECONOMIC STABILITY: FOOD INSECURITY: WITHIN THE PAST 12 MONTHS, YOU WORRIED THAT YOUR FOOD WOULD RUN OUT BEFORE YOU GOT MONEY TO BUY MORE.: NEVER TRUE

## 2025-08-12 ASSESSMENT — ENCOUNTER SYMPTOMS
ABDOMINAL PAIN: 0
VOMITING: 0
COUGH: 1
CHEST TIGHTNESS: 0
NAUSEA: 0
WHEEZING: 0
SHORTNESS OF BREATH: 0
DIARRHEA: 0
CONSTIPATION: 0

## 2025-08-12 ASSESSMENT — PATIENT HEALTH QUESTIONNAIRE - PHQ9
2. FEELING DOWN, DEPRESSED OR HOPELESS: NOT AT ALL
SUM OF ALL RESPONSES TO PHQ QUESTIONS 1-9: 0
SUM OF ALL RESPONSES TO PHQ QUESTIONS 1-9: 0
1. LITTLE INTEREST OR PLEASURE IN DOING THINGS: NOT AT ALL
SUM OF ALL RESPONSES TO PHQ QUESTIONS 1-9: 0
SUM OF ALL RESPONSES TO PHQ QUESTIONS 1-9: 0

## (undated) DEVICE — SPONGE LAP W18XL18IN WHT COT 4 PLY FLD STRUNG RADPQ DISP ST 2 PER PACK

## (undated) DEVICE — DRAPE,T,LAPARO,TRANS,STERILE: Brand: MEDLINE

## (undated) DEVICE — SOLUTION SCRB 4OZ 10% POVIDONE IOD ANTIMIC BTL

## (undated) DEVICE — PREMIUM DRY TRAY LF: Brand: MEDLINE INDUSTRIES, INC.

## (undated) DEVICE — MAJOR SET UP: Brand: MEDLINE INDUSTRIES, INC.

## (undated) DEVICE — GLOVE SURG SZ 8 CRM LTX FREE POLYISOPRENE POLYMER BEAD ANTI

## (undated) DEVICE — SUTURE VCRL SZ 0 L18IN ABSRB UD POLYGLACTIN 910 BRAID TIE J912G

## (undated) DEVICE — STRIP,CLOSURE,WOUND,MEDI-STRIP,1/2X4: Brand: MEDLINE

## (undated) DEVICE — SUTURE VCRL + SZ 3-0 L27IN ABSRB UD L26MM SH 1/2 CIR VCP416H

## (undated) DEVICE — SOLUTION PREP PAINT POV IOD FOR SKIN MUCOUS MEM

## (undated) DEVICE — GOWN SIRUS NONREIN XL W/TWL: Brand: MEDLINE INDUSTRIES, INC.

## (undated) DEVICE — CATHETER URETH 16FR BLLN 5CC 2 W F SPEC M OLV COUDE TIP

## (undated) DEVICE — SUTURE VCRL + SZ 4-0 L18IN ABSRB UD L19MM PS-2 3/8 CIR PRIM VCP496H

## (undated) DEVICE — SUTURE VCRL SZ 0 L36IN ABSRB UD CT-1 L36MM 1/2 CIR TAPR PNT VCP946H

## (undated) DEVICE — SUTURE VCRL + SZ 3-0 L27IN ABSRB WHT CT-1 1/2 CIR VCP258H

## (undated) DEVICE — SUTURE VCRL + SZ 0 L27IN ABSRB UD L36MM CT-1 1/2 CIR VCPP41D

## (undated) DEVICE — DBD-PACK,LAPAROSCOPY,PK I,SIRUS: Brand: MEDLINE

## (undated) DEVICE — DRAPE,UNDERBUTTOCKS,PCH,STERILE: Brand: MEDLINE

## (undated) DEVICE — 3M™ TEGADERM™ TRANSPARENT FILM DRESSING FRAME STYLE, 1627, 4 IN X 10 IN (10 CM X 25 CM), 20/CT 4CT/CASE: Brand: 3M™ TEGADERM™

## (undated) DEVICE — ELECTRODE BLDE L6.5IN CAUT EXT DISP

## (undated) DEVICE — SPONGE GZ W4XL4IN COT 12 PLY TYP VII WVN C FLD DSGN STERILE

## (undated) DEVICE — CLEANER,CAUTERY TIP,2X2",STERILE: Brand: MEDLINE

## (undated) DEVICE — PAD SANITARY MTRN TAB BELT WRP NS 11IN

## (undated) DEVICE — 1LYRTR 16FR10ML100%SIL UMS SNP: Brand: MEDLINE INDUSTRIES, INC.

## (undated) DEVICE — MERCY HEALTH WEST TURNOVER: Brand: MEDLINE INDUSTRIES, INC.

## (undated) DEVICE — 3M™ IOBAN™ 2 ANTIMICROBIAL INCISE DRAPE 6650EZ: Brand: IOBAN™ 2

## (undated) DEVICE — 3M™ STERI-STRIP™ COMPOUND BENZOIN TINCTURE 40 BAGS/CARTON 4 CARTONS/CASE C1544: Brand: 3M™ STERI-STRIP™